# Patient Record
Sex: FEMALE | Race: WHITE | NOT HISPANIC OR LATINO | Employment: PART TIME | URBAN - NONMETROPOLITAN AREA
[De-identification: names, ages, dates, MRNs, and addresses within clinical notes are randomized per-mention and may not be internally consistent; named-entity substitution may affect disease eponyms.]

---

## 2021-02-09 LAB
BILIRUB SERPL-MCNC: NEGATIVE MG/DL
BLOOD URINE, POC: NEGATIVE
CLARITY, POC UA: CLEAR
COLOR, POC UA: YELLOW
GLUCOSE UR QL STRIP: NEGATIVE
KETONES UR QL STRIP: NORMAL
LEUKOCYTE EST, POC UA: NORMAL
NITRITE, POC UA: POSITIVE
PH, POC UA: 6
POC BETA-HCG (QUAL): POSITIVE
PROTEIN, POC: NEGATIVE
SPECIFIC GRAVITY, POC UA: 1.02
UROBILINOGEN, POC UA: NORMAL

## 2021-02-22 LAB — HCV AB SERPL QL IA: NORMAL

## 2021-03-09 LAB
BILIRUB SERPL-MCNC: NEGATIVE MG/DL
CLARITY, POC UA: CLEAR
COLOR, POC UA: YELLOW
LEUKOCYTE EST, POC UA: NEGATIVE
NITRITE, POC UA: NEGATIVE
PROTEIN, POC: NEGATIVE

## 2021-04-05 LAB
CLARITY, POC UA: CLEAR
COLOR, POC UA: YELLOW
GLUCOSE UR QL STRIP: NEGATIVE
LEUKOCYTE EST, POC UA: NEGATIVE
NITRITE, POC UA: NEGATIVE
PROTEIN, POC: NEGATIVE

## 2021-05-21 ENCOUNTER — HISTORICAL (OUTPATIENT)
Dept: ADMINISTRATIVE | Facility: HOSPITAL | Age: 26
End: 2021-05-21

## 2021-06-01 LAB
BILIRUB SERPL-MCNC: NEGATIVE MG/DL
BLOOD URINE, POC: NEGATIVE
CLARITY, POC UA: CLEAR
COLOR, POC UA: YELLOW
GLUCOSE UR QL STRIP: NEGATIVE
KETONES UR QL STRIP: NEGATIVE
LEUKOCYTE EST, POC UA: NEGATIVE
NITRITE, POC UA: NEGATIVE
PH, POC UA: 6
PROTEIN, POC: NEGATIVE
SPECIFIC GRAVITY, POC UA: 1.02
UROBILINOGEN, POC UA: NORMAL

## 2021-06-07 LAB — GLUCOSE 1H P 100 G GLC PO SERPL-MCNC: 74 MG/DL (ref 70–140)

## 2021-06-23 LAB
BILIRUB SERPL-MCNC: NEGATIVE MG/DL
BLOOD URINE, POC: NEGATIVE
CLARITY, POC UA: CLEAR
COLOR, POC UA: YELLOW
GLUCOSE UR QL STRIP: NEGATIVE
KETONES UR QL STRIP: NEGATIVE
LEUKOCYTE EST, POC UA: NORMAL
NITRITE, POC UA: NEGATIVE
PH, POC UA: 7
PROTEIN, POC: NEGATIVE
SPECIFIC GRAVITY, POC UA: 1.01
UROBILINOGEN, POC UA: NORMAL

## 2021-07-07 LAB
ALBUMIN SERPL-MCNC: 3.7 G/DL (ref 3.4–5)
ALBUMIN/GLOB SERPL: 1.3 {RATIO}
ALP SERPL-CCNC: 54 U/L (ref 50–144)
ALT SERPL-CCNC: 30 U/L (ref 1–45)
ANION GAP SERPL CALC-SCNC: 6 MMOL/L (ref 7–16)
AST SERPL-CCNC: 26 U/L (ref 14–36)
BILIRUB SERPL-MCNC: 0.42 MG/DL (ref 0.1–1)
BUN SERPL-MCNC: 9 MG/DL (ref 7–20)
CALCIUM SERPL-MCNC: 9.3 MG/DL (ref 8.4–10.2)
CHLORIDE SERPL-SCNC: 107 MMOL/L (ref 94–110)
CO2 SERPL-SCNC: 23 MMOL/L (ref 21–32)
CREAT SERPL-MCNC: 0.48 MG/DL (ref 0.52–1.04)
CREAT/UREA NIT SERPL: 18.8 (ref 12–20)
ERYTHROCYTE [DISTWIDTH] IN BLOOD BY AUTOMATED COUNT: 13 % (ref 11–14.5)
EST CREAT CLEARANCE SER (OHS): 184.98 ML/MIN
GLOBULIN SER-MCNC: 2.8 G/DL (ref 2–3.9)
GLUCOSE SERPL-MCNC: 66 MGM./DL (ref 70–115)
HCT VFR BLD AUTO: 33.2 % (ref 36–48)
HGB BLD-MCNC: 10.8 G/DL (ref 11.8–16)
MCH RBC QN AUTO: 31.6 PG (ref 27–34)
MCHC RBC AUTO-ENTMCNC: 32.5 G/DL (ref 31–37)
MCV RBC AUTO: 97.1 FL (ref 79–99)
PLATELET # BLD AUTO: 261 X10(3)/MCL (ref 140–371)
PMV BLD AUTO: 9.1 FL (ref 9.4–12.4)
POTASSIUM SERPL-SCNC: 4.3 MMOL/L (ref 3.5–5.1)
PROT SERPL-MCNC: 6.5 G/DL (ref 6.3–8.2)
RBC # BLD AUTO: 3.42 X10(6)/MCL (ref 4–5.1)
SODIUM SERPL-SCNC: 136 MMOL/L (ref 135–145)
WBC # SPEC AUTO: 8.9 X10(3)/MCL (ref 4–11.5)

## 2021-08-02 LAB — RPR SER QL: NORMAL

## 2021-08-30 LAB
GLUCOSE UR QL STRIP: NEGATIVE
PROTEIN, POC: NEGATIVE

## 2021-09-07 LAB
CLARITY, POC UA: CLEAR
COLOR, POC UA: YELLOW
GLUCOSE UR QL STRIP: NEGATIVE
HBV SURFACE AG SERPL QL IA: NEGATIVE
HIV 1+2 AB+HIV1 P24 AG SERPL QL IA: NORMAL
LEUKOCYTE EST, POC UA: NEGATIVE
NITRITE, POC UA: NEGATIVE
PROTEIN, POC: NEGATIVE
RPR SER QL: NORMAL

## 2021-09-08 LAB
ERYTHROCYTE [DISTWIDTH] IN BLOOD BY AUTOMATED COUNT: 13.5 % (ref 11–14.5)
HCT VFR BLD AUTO: 23.1 % (ref 36–48)
HGB BLD-MCNC: 7.8 G/DL (ref 11.8–16)
MCH RBC QN AUTO: 32.5 PG (ref 27–34)
MCHC RBC AUTO-ENTMCNC: 33.8 G/DL (ref 31–37)
MCV RBC AUTO: 96.3 FL (ref 79–99)
PLATELET # BLD AUTO: 210 X10(3)/MCL (ref 140–371)
PMV BLD AUTO: 9.8 FL (ref 9.4–12.4)
RBC # BLD AUTO: 2.4 X10(6)/MCL (ref 4–5.1)
WBC # SPEC AUTO: 13.6 X10(3)/MCL (ref 4–11.5)

## 2021-10-20 LAB — POC BETA-HCG (QUAL): NEGATIVE

## 2022-04-10 ENCOUNTER — HISTORICAL (OUTPATIENT)
Dept: ADMINISTRATIVE | Facility: HOSPITAL | Age: 27
End: 2022-04-10

## 2022-04-27 VITALS
HEIGHT: 62 IN | BODY MASS INDEX: 24.26 KG/M2 | DIASTOLIC BLOOD PRESSURE: 56 MMHG | SYSTOLIC BLOOD PRESSURE: 108 MMHG | WEIGHT: 131.81 LBS

## 2022-04-30 ENCOUNTER — HISTORICAL (OUTPATIENT)
Dept: ADMINISTRATIVE | Facility: HOSPITAL | Age: 27
End: 2022-04-30

## 2022-05-03 NOTE — HISTORICAL [facility]
This is a historical note converted from [place]. Formatting and pictures may have been removed.  Please reference Hayden for original formatting and attached multimedia. Chief Complaint  New OB. LMP 12/14/20. + Nitrite on UA today. Last pap 6/11/19 wnl.  History of Present Illness  26yo G1 @ 8w1d by LMP, presents for new OB. No complaints.?  LMP/EGA/FREYA  Gestational Age (EGA) and FREYA?? ? * Note: EGA calculated as of 02/09/2021  ?  FREYA:?09/27/2021???EGA*:?7 weeks 1 day ? ? ? ? ? ?Type:?Final??Method Date:?02/09/2021  ?  ?? ? ?Method:?Ultrasound?(02/09/2021)  ?? ? ?Confirmation:?Confirmed  ?? ? ?Description:?--  ?? ? ?Comments:?--  ?? ? ?Entered by:?Napoleon DEJESUS, Elvira ESTRADA on 02/09/2021?  ?  Other FREYA Calculations for this Pregnancy:  ?  ?? ? ?Method:?Last Menstrual Period  ?? ? ?Method Date:?12/14/2020  ?? ? ?FREYA:?09/20/2021  ?? ? ?EGA (At Entry):?8 weeks 1 days  ?? ? ?Type:?Non-Authoritative  ?? ? ?Comments:?--  ?? ? ?Entered by:?Ro Nash LPN on 02/09/2021  Gynecological History  Last Menstrual Period: 12/14/20  Date of Last Pap Smear: 06/11/19  Menstrual Status Intake: Menarcheal  Age of Menarche: 12  STIs/STDs: No  Intermenstrual Bleeding: No  Abnormal Pap: No  Current Birth Control Method: Pregnant  Dysmenorrhea: Mild  HPV Vaccine: Yes  Flow: Light  Dyspareunia: No  Duration of Flow: 7  Postcoital Bleeding: No  Frequency of Cycle: 28  Dysuria: No  Additional GYN Information: pap 6/11/19 wnl  Breast CA Hx: No  Sexually Active: Yes  Review of Systems  General/Constitutional:?  Chills?denies. Fatigue/weakness?denies?. Fever?denies?. Night sweats?denies?.  Skin:  Rash?denies.  Respiratory:  Cough?denies?. Hemoptysis?denies?. SOB?denies?. Sputum production?denies?. Wheezing?denies?.  Cardiovascular:  Chest pain?denies. Dizziness?denies. Palpitations?denies. Swelling in hands/feet?denies.  Breast:  Changes in skin of nipple or breast?denies?. Breast lump?denies. Breast pain?denies. Nipple  discharge?denies?.  Gastrointestinal:  Abdominal pain?denies?. Blood in stool?denies?. Constipation?denies?. Diarrhea?denies?. Heartburn?denies?. Nausea?denies?. Vomiting?deniesGenitourinary:  Incontinence?denies?. Blood in urine?denies. Frequent urination?denies?. Painful urination?denies.  Gyneocologic:  Irregular menses?denies. Heavy bleeding?denies. Painful menses?denies. Vaginal discharge/ Odor?denies?. Vaginal lesion/pain?denies. ?Pelvic pain?denies?. Decreased libido?denies. Vulvar lesion/ulcer?denies?. Prolapse of genital organs?denies?. Painful intercourse?denies?.  Menopausal:  Hot flushes?denies. Vaginal dryness?denies.  Musculoskeletal:  Joint/maico pain?denies. Decreased ROM?denies. Muscle aches?denies. Swollen joints?denies?. Weakness?denies.  Neurologic:  Confusion?denies. Trouble walking?denies. Trouble with balance?denies?Balance difficulty?denies. Gait abnormalities?denies. Headache?denies. Tingling/Numbness?denies.  Psychiatric:  Mood lability?denies.?Anxiety or panic attacks?denies. Depressed mood?denies. Suicidal thoughts?denies.?  Physical Exam  Vitals & Measurements  T:?37.3? ?C (Temporal Artery)? BP:?114/68?  HT:?157.48?cm? WT:?60.600?kg? BMI:?24.44? LMP:?12/14/2020 00:00 CST?  Chaperone: present  ?   General appearance: - healthy, well-nourished and well-developed  ?   Psychiatric: Orientation to time, place and person. Normal mood and affect and active, alert  ?   Skin:  Appearance: no rashes or lesions  ?   Neck:  Neck: supple, FROM, trachea midline. and no masses  Thyroid: no enlargement or nodules and non-tender.  ?  ?   Cardiovascular:  Auscultation: RRR and no murmur  Peripheral Vascular: no varicosities, LLE edema, RLE edema, calf tenderness, and palpable cord and pedal pulses intact  ?   Lungs:  Respiratory effort: no intercostal retractions or accessory muscle usage  Auscultation: no wheezing, rales/crackles, or rhonchi and clear to auscultation  ?  Breast:  Inspection/Palpation: no  tenderness, discrete/distinct masses, skin changes, or abnormal secretions. Nipple appearance normal.  ?  Abdomen:  Auscultation/Inspection/Palpation: no hepatomegaly, splenomegaly, masses , tenderness? or CVA tenderness and soft, non distended bowel sounds preset  Hernia: no palpable hernias  ?  Female Genitalia:  Vulva: no masses,?tenderness or?lesions  Bladder/Urethra: no urethral discharge or mass, normal meatus, bladder non-distended  Vagina: no tenderness, erythema, cystocele, rectocele, abnormal vaginal discharge, or vesicle(s) or ulcers  Cervix: no discharge , no cervical lacerations noted or motion tenderness and grossly normal  Uterus: normal size and shape and midline, non-tender, and no uterine prolapse.  Adnexa/Parametria: no parametrial tenderness or mass, no adnexal tenderness or ovarian mass  ?  Lymph Nodes:  Palpation: non tender submandibular nodes, axillary nodes, or inguinal nodes  ?  Rectal Exam:  Rectum: normal perianal skin  Assessment/Plan  1.?UTI in pregnancy, antepartum?O23.40  ?- UA  - discussed urinalysis results and patients symptoms  - educated  - begin antibiotics immediately  - culture sent to lab  - advised to to call if symptoms do not improve within 24 hrs or if she develops fever  ?  ?  Ordered:  cephalexin, 500 mg = 1 cap(s), Oral, QID, X 7 day(s), # 28 cap(s), 0 Refill(s), Pharmacy: [facility], 157.48, cm, Height/Length Dosing, 02/09/21 14:31:00 CST, 60.6, kg, Weight Dosing, 02/09/21 14:31:00 CST  multivitamin, prenatal, 1 tab(s), Oral, Daily, # 60 tab(s), 6 Refill(s), Pharmacy: [facility], 157.48, cm, Height/Length Dosing, 02/09/21 14:31:00 CST, 60.6, kg, Weight Dosing, 02/09/21 14:31:00 CST  Clinic Follow up, *Est. 03/09/21 3:00:00 CST, Order for future visit, UTI in pregnancy, antepartum, BHAVIN DOVE  Office/Outpatient Visit Level 4 Established 38340 , UTI in pregnancy, antepartum, BHAVIN DOVE, 02/09/21 14:49:00 CST  ?  2.?7 weeks  gestation of pregnancy?Z3A.01  ?We discussed diet and supplements and modifiable risk factors.  Patient advised to continue moderate physical activity.  Patient will report unusual headaches, visual disturbances, pelvic pain or cramping, vaginal bleeding, rupture of membranes, extreme swelling in hands or face, diminished urine volume, any prolonged illness or infection, or persistent symptoms of labor.  ?  ?   Discussed with patient on?the uncertainty of?COVID 19?in relation to?pregnancy?complications with patient and unborn fetus.? Advised to CDC guidelines of social distancing ,patient to stay home as much as possible, and?limit contact with others.? Travel restrictions discussed. ?Patient to call office if she has?a temperature over 100.4?cough shortness of breath?or GI symptoms. Patient educated if?she feels she is in an emergency to call 911.?  ?  Ordered:  cephalexin, 500 mg = 1 cap(s), Oral, QID, X 7 day(s), # 28 cap(s), 0 Refill(s), Pharmacy: [facility], 157.48, cm, Height/Length Dosing, 21 14:31:00 CST, 60.6, kg, Weight Dosing, 21 14:31:00 CST  ?  Orders:  Urine Culture [id number], Routine collect, 21 14:25:00 CST, Order for future visit, Outside Lab Order?, Urine, Clean Catch, Nurse collect, Stop date 21 14:25:00 CST, Pregnant  Referrals  Clinic Follow up, *Est. 21 3:00:00 CST, Order for future visit, UTI in pregnancy, antepartum, [facility]   OB History  Pregnancy History???(0,0,0,0)?? ??  ?  ??No previous pregnancies history have been recorded  Problem List/Past Medical History  Ongoing  7 weeks gestation of pregnancy  Pregnant  UTI in pregnancy, antepartum  Historical  No qualifying data  Procedure/Surgical History  PAP (2019)  REPAIR OF EARDRUM   Medications  Claritin 10 mg oral tablet, 10 mg= 1 tab(s), Oral, Daily  Keflex 500 mg oral capsule, 500 mg= 1 cap(s), Oral, QID  Prenatal 1 Plus 1 (Pt. Own), 1 tab(s), Oral, Daily  Prenatal Multivitamins  with Folic Acid 0.8 mg oral tablet, 1 tab(s), Oral, Daily, 6 refills  Allergies  No Known Allergies  No Known Medication Allergies  Social History  Abuse/Neglect  No, 07/15/2020  Alcohol  Never, 07/15/2020  Sexual  Sexually active: Yes. Gender Identity Identifies as female. No, 07/15/2020  Tobacco  Never (less than 100 in lifetime), N/A, 07/15/2020  Family History  Primary malignant neoplasm of breast: Negative: Mother, Father, Sister and Brother.  Primary malignant neoplasm of colon: Negative: Mother, Father, Sister and Brother.  Primary malignant neoplasm of female genital organ: Negative: Mother, Father, Sister and Brother.  Immunizations  Vaccine Date Status   human papillomavirus vaccine 03/26/2009 Recorded   human papillomavirus vaccine 04/14/2008 Recorded   human papillomavirus vaccine 10/23/2007 Recorded   Health Maintenance  Health Maintenance  ???Pending?(in the next year)  ??? ??OverDue  ??? ? ? ?Influenza Vaccine due??10/01/20??and every 1??day(s)  ??? ??Due?  ??? ? ? ?Alcohol Misuse Screening due??01/02/21??and every 1??year(s)  ??? ? ? ?ADL Screening due??02/09/21??and every 1??year(s)  ??? ? ? ?Depression Screening due??02/09/21??Unknown Frequency  ??? ? ? ?Tetanus Vaccine due??02/09/21??and every 10??year(s)  ??? ??Due In Future?  ??? ? ? ?Obesity Screening not due until??01/01/22??and every 1??year(s)  ???Satisfied?(in the past 1 year)  ??? ??Satisfied?  ??? ? ? ?Blood Pressure Screening on??02/09/21.??Satisfied by Ro Nash LPN  ??? ? ? ?Body Mass Index Check on??02/09/21.??Satisfied by Ro Nash LPN  ??? ? ? ?Obesity Screening on??02/09/21.??Satisfied by Ro Nash LPN  ?

## 2022-09-16 ENCOUNTER — HISTORICAL (OUTPATIENT)
Dept: ADMINISTRATIVE | Facility: HOSPITAL | Age: 27
End: 2022-09-16

## 2022-09-17 ENCOUNTER — HISTORICAL (OUTPATIENT)
Dept: ADMINISTRATIVE | Facility: HOSPITAL | Age: 27
End: 2022-09-17

## 2023-02-27 ENCOUNTER — TELEPHONE (OUTPATIENT)
Dept: OBSTETRICS AND GYNECOLOGY | Facility: CLINIC | Age: 28
End: 2023-02-27

## 2023-02-27 NOTE — TELEPHONE ENCOUNTER
----- Message from Sebastian Payne sent at 2/27/2023 10:09 AM CST -----  Pt has questions regarding medication she is on-     Started taking Buspirone 12/22  Waking up throughout the night.   Feet itch      Started taking OCP 12/22,   bleeding since.

## 2023-03-03 ENCOUNTER — TELEPHONE (OUTPATIENT)
Dept: FAMILY MEDICINE | Facility: CLINIC | Age: 28
End: 2023-03-03

## 2023-03-03 DIAGNOSIS — R11.10 VOMITING, UNSPECIFIED VOMITING TYPE, UNSPECIFIED WHETHER NAUSEA PRESENT: Primary | ICD-10-CM

## 2023-03-03 RX ORDER — ONDANSETRON 8 MG/1
8 TABLET, ORALLY DISINTEGRATING ORAL EVERY 12 HOURS PRN
Qty: 20 TABLET | Refills: 1 | Status: SHIPPED | OUTPATIENT
Start: 2023-03-03 | End: 2023-04-27

## 2023-03-03 NOTE — TELEPHONE ENCOUNTER
Pt has had vomiting and diarrhea all night.  She would like to know if something can be called in    [facility]

## 2023-03-15 NOTE — PROGRESS NOTES
Chief Complaint:  follow up buspar and sprintec    History of Present Illness:  Patient is a 27 y.o.  presents to follow up on Buspar. She discontinued Buspar due to making her overanalyze her actions and caused itching of the feet. Once stopping, her thinking became more clear, mood improved and feet stopped itching. States she still feels anxious and has difficulty concentrating.     Also follow up on sprintec. C/o daily bleeding since initiation in December. Flow varies day to day. Unsure of LMP due to inconsistent bleeding. Denies stress.       LMP: Irregular bleeding  Frequency: continuous since Dec.  Cycle Length: n/a   Flow: varies  Intermenstrual Bleeding: Yes  Postcoital Bleeding: No  Dysmenorrhea: No  Sexually Active: Yes   Dyspareunia: Yes  Contraception: OCP, Sprintec   H/o STI: No   Last pap: 22 - Neg   H/o Abnormal Pap: No     Review of systems:  General/Constitutional: Chills denies. Fatigue/weakness denies. Fever denies . Night sweats denies . Hot flashes denies  Gastrointestinal: Abdominal pain denies. Blood in stool denies. Constipation denies. Diarrhea denies. Heartburn denies. Nausea denies. Vomiting denies   Genitourinary: Incontinence denies. Blood in urine denies. Frequent urination denies.  Urgency denies. Painful urination denies. Nocturia denies   Gynecologic: Irregular menses ADMITS.  Heavy bleeding  ADMITS.  Painful menses denies.  Vaginal discharge denies. Vaginal odor denies. Vaginal itching/Irritation denies. Vaginal lesion denies.  Pelvic pain denies. Decreased libido denies. Vulvar lesion denies. Prolapse of genital organs denies. Painful intercourse denies. Postcoital bleeding denies   Psychiatric: Mood lability denies.  Depressed mood denies. Suicidal thoughts denies. Anxiety denies.  Overwhelmed denies.  Appetite normal. Energy level normal       OB History          1    Para   1    Term   1            AB        Living   1         SAB        IAB         "Ectopic        Multiple        Live Births   1               Past Medical History:   Diagnosis Date    Anxiety disorder, unspecified      Current Outpatient Medications:     norgestimate-ethinyl estradioL (ORTHO-CYCLEN) 0.25-35 mg-mcg per tablet, Take 1 tablet by mouth., Disp: , Rfl:     buPROPion (WELLBUTRIN XL) 150 MG TB24 tablet, Take 1 tablet (150 mg total) by mouth once daily., Disp: 30 tablet, Rfl: 3    busPIRone (BUSPAR) 5 MG Tab, Take 10 mg by mouth 2 (two) times daily., Disp: , Rfl:     ondansetron (ZOFRAN-ODT) 8 MG TbDL, Take 1 tablet (8 mg total) by mouth every 12 (twelve) hours as needed (nausea/vomiting). (Patient not taking: Reported on 3/16/2023), Disp: 20 tablet, Rfl: 1    Review of patient's allergies indicates:  No Known Allergies    Past Surgical History:   Procedure Laterality Date    TYMPANIC MEMBRANE REPAIR Right 2013     Family History   Problem Relation Age of Onset    Breast cancer Neg Hx     Colon cancer Neg Hx     Ovarian cancer Neg Hx     Uterine cancer Neg Hx     Cervical cancer Neg Hx      Social History     Socioeconomic History    Marital status: Unknown   Tobacco Use    Smoking status: Never     Passive exposure: Never    Smokeless tobacco: Never   Substance and Sexual Activity    Alcohol use: Yes     Comment: 1-2 times per week    Drug use: Never    Sexual activity: Yes     Partners: Male     Birth control/protection: OCP     Comment: ;  STI: None       Physical Exam:  /64 (BP Location: Right arm, Patient Position: Sitting, BP Method: Medium (Manual))   Temp 98.6 °F (37 °C) (Temporal)   Ht 5' 3" (1.6 m)   Wt 57.5 kg (126 lb 12.2 oz)   BMI 22.46 kg/m²     Chaperone present.         Constitutional: General appearance: healthy, well-nourished and well-developed   Psychiatric:  Orientation to time, place and person. Normal mood and affect and active, alert   Abdomen: Auscultation/Inspection/Palpation: No tenderness or masses. Soft, nondistended    Female Genitalia:      " Vulva: no masses, atrophy or lesions      Bladder/Urethra: no urethral discharge or mass, normal meatus, bladder                 non-distended.      Vagina: no tenderness, erythema, cystocele, rectocele, abnormal                vaginal discharge, or vesicle(s) or ulcers                   Cervix: no discharge or cervical motion tenderness and grossly normal      Uterus: normal size and shape and midline, non-tender, and no uterine                  prolapse.      Adnexa/Parametria: no parametrial tenderness or mass, no adnexal tenderness                 or ovarian mass.         Assessment/Plan:  1. Menometrorrhagia  Educated  Bleeding precautions  Discontinue Sprintec  Negative UPT today  RTS pelvis  TSH, FT4, CBC  Gc/cz/tv/myco/urea    Explained common options for contraception including natural family planning, barrier methods, depo-provera, ocps,  patch, nuvaring, IUD, Nexplanon, and sterilization.        Discussed that pills should be taken at the same time every day to minimize breakthrough bleeding and to expect breakthrough bleeding for the first 3 packs and then it should resolve. If BTB continues after 3 months, a change may be necessary.        Advised patient that smoking is harmful due to increased risks of stroke, heart attack and blood clots when taking pills. Patient to contact us immediately if she experiences severe abdominal pain, severe chest pain, severe headaches, eye-visual changes, severe leg pain or SOB.       Discussed that birth control do not protect against STDs.    Desires Mirena IUD, forms signed today. Will continue Sprintec until IUD arrives      2. Anxiety  Educated     Counseling information given for Arabella Barros [facility] [phone]     HI & SI precautions  Mood stable  Rx: Wellbutrin 150mg XL daily

## 2023-03-16 ENCOUNTER — OFFICE VISIT (OUTPATIENT)
Dept: OBSTETRICS AND GYNECOLOGY | Facility: CLINIC | Age: 28
End: 2023-03-16
Payer: COMMERCIAL

## 2023-03-16 VITALS
DIASTOLIC BLOOD PRESSURE: 64 MMHG | HEIGHT: 63 IN | BODY MASS INDEX: 22.46 KG/M2 | WEIGHT: 126.75 LBS | SYSTOLIC BLOOD PRESSURE: 110 MMHG | TEMPERATURE: 99 F

## 2023-03-16 DIAGNOSIS — N92.1 MENOMETRORRHAGIA: ICD-10-CM

## 2023-03-16 DIAGNOSIS — F41.9 ANXIETY: ICD-10-CM

## 2023-03-16 LAB
B-HCG UR QL: NEGATIVE
CHLAMYDIA BY PCR: NEGATIVE
CTP QC/QA: YES
Lab: NEGATIVE
MYCOPLASMA GENITALIUM, PCR: NEGATIVE
NEISSERIA GONORRHEA BY PCR: NEGATIVE
TRICHOMONAS VAGINALIS: NEGATIVE
U UREALYTICUM DNA SPEC QL NAA+PROBE: NEGATIVE

## 2023-03-16 PROCEDURE — 99214 OFFICE O/P EST MOD 30 MIN: CPT | Mod: ,,, | Performed by: OBSTETRICS & GYNECOLOGY

## 2023-03-16 PROCEDURE — 99214 PR OFFICE/OUTPT VISIT, EST, LEVL IV, 30-39 MIN: ICD-10-PCS | Mod: ,,, | Performed by: OBSTETRICS & GYNECOLOGY

## 2023-03-16 RX ORDER — BUSPIRONE HYDROCHLORIDE 5 MG/1
10 TABLET ORAL 2 TIMES DAILY
COMMUNITY
Start: 2022-12-29 | End: 2023-11-01

## 2023-03-16 RX ORDER — BUPROPION HYDROCHLORIDE 150 MG/1
150 TABLET ORAL DAILY
Qty: 30 TABLET | Refills: 3 | Status: SHIPPED | OUTPATIENT
Start: 2023-03-16 | End: 2023-07-13

## 2023-03-16 RX ORDER — NORGESTIMATE AND ETHINYL ESTRADIOL 0.25-0.035
1 KIT ORAL
COMMUNITY
Start: 2023-03-03 | End: 2023-11-01

## 2023-03-22 ENCOUNTER — HOSPITAL ENCOUNTER (OUTPATIENT)
Dept: RADIOLOGY | Facility: HOSPITAL | Age: 28
Discharge: HOME | End: 2023-03-22
Attending: OBSTETRICS & GYNECOLOGY
Payer: COMMERCIAL

## 2023-03-22 DIAGNOSIS — N92.1 MENOMETRORRHAGIA: ICD-10-CM

## 2023-03-22 PROCEDURE — 76856 US EXAM PELVIC COMPLETE: CPT | Mod: TC

## 2023-04-27 ENCOUNTER — PROCEDURE VISIT (OUTPATIENT)
Dept: OBSTETRICS AND GYNECOLOGY | Facility: CLINIC | Age: 28
End: 2023-04-27
Payer: COMMERCIAL

## 2023-04-27 ENCOUNTER — PATIENT MESSAGE (OUTPATIENT)
Dept: OBSTETRICS AND GYNECOLOGY | Facility: CLINIC | Age: 28
End: 2023-04-27

## 2023-04-27 VITALS
WEIGHT: 124 LBS | DIASTOLIC BLOOD PRESSURE: 70 MMHG | HEIGHT: 63 IN | BODY MASS INDEX: 21.97 KG/M2 | SYSTOLIC BLOOD PRESSURE: 114 MMHG

## 2023-04-27 DIAGNOSIS — Z30.430 ENCOUNTER FOR INSERTION OF MIRENA IUD: ICD-10-CM

## 2023-04-27 NOTE — PROCEDURES
"Chief Complaint:  IUD insertion    History of Present Illness:   Socorro Stevens is a 27 y.o.  who presents today for Mirena IUD placement.      LMP: Irregular bleeding  Frequency: continuous since Dec.  Cycle Length: n/a   Flow: varies  Intermenstrual Bleeding: Yes  Postcoital Bleeding: No  Dysmenorrhea: No  Sexually Active: Yes   Dyspareunia: Yes  Contraception: OCP, Sprintec   H/o STI: No   Last pap: 22 - Neg   H/o Abnormal Pap: No   Gardasil: 3/3   MMG: N/A      Review of Systems:  General/Constitutional: Chills denies. Fatigue/weakness denies. Fever denies . Night sweats denies . Hot flashes denies  Gynecologic: Irregular menses denies.  Heavy bleeding  denies.  Painful menses denies.  Vaginal discharge denies. Vaginal odor denies. Vaginal itching/Irritation denies. Vaginal lesion denies.  Pelvic pain denies. Decreased libido denies. Vulvar lesion denies. Prolapse of genital organs denies. Painful intercourse denies. Postcoital bleeding denies   Psychiatric: Mood lability denies.  Depressed mood denies. Suicidal thoughts denies. Anxiety denies.  Overwhelmed denies.  Appetite normal. Energy level normal      OB History          1    Para   1    Term   1            AB        Living   1         SAB        IAB        Ectopic        Multiple        Live Births   1                 Physical Exam:  /70 (BP Location: Right arm)   Ht 5' 3" (1.6 m)   Wt 56.2 kg (124 lb)   Breastfeeding No   BMI 21.97 kg/m²     Chaperone present.    Constitutional: General appearance: healthy, well-nourished and well-developed  Psychiatric: Orientation to time, place and person. Normal mood and affect and active, alert  Abdomen: Auscultation/Inspection/Palpation: deferred  Female Genitalia:  Vulva: no masses, atrophy or lesions  Bladder/Urethra: no urethral discharge or mass, normal meatus, bladder   non-distended.  Vagina: no tenderness, erythema, cystocele, rectocele, abnormal  vaginal discharge, or " vesicle(s) or ulcers   Cervix: no discharge or cervical motion tenderness and grossly normal  Uterus: normal size and shape and midline, non-tender, and no uterine   prolapse.  Adnexa/Parametria: no parametrial tenderness or mass, no adnexal tenderness  or ovarian mass.    Procedure:   After having reviewed the contraindications, side effects, and risks and benefits of all major options for reversible contraception, the patient chose the Mirena IUD for contraception. We discussed the risks of insertion, pelvic infection, and the possibility of failure. Patient stated that she has a good understanding of all we discussed, and all questions were answered regarding IUD use. Patient signed consent form.   Pelvic examination was normal. Pap smear is current. Urine pregnancy test negative.  With the patient in the dorsal lithotomy position, a speculum was placed in the vagina. The cervix and vagina were prepped with Betadine, the cervix was stabilized with a tenaculum, and the uterus was sounded to a depth of 7 cm. The Mirena IUD was then placed in the endometrial cavity as directed without complications. The strings were trimmed to approximately to 2 cm.  Patient tolerated procedure well.      Assessment/Plan:  1. Encounter for insertion of mirena IUD   Explained to patient, and options for contraceptive including natural family planning, barrier methods, oral contraceptives, patch, NuvaRing, Depo-Provera, Nexplanon, IUD, and sterilization     Patient desires IUD insertion.      Risk factors reviewed and not present.      HCG negative.       Tolerated well.       Discussed cramping and bleeding for 6-8 weeks.  If breakthrough bleeding continues after 3 months add back or change may be necessary.       A second form of birth control should be used for the first 10 days after insertion.       Advised patient that smoking is harmful due to increased risk of stroke, heart attack, and blood clots when taking contraceptive  hormones. Patient is to contact us immediately if she experiences severe abdominal pain, severe chest pain, severe headaches, eye visual changes, severe leg pain, or shortness of breath       Call with fever, foul discharge, or intense abdominal pain. Ibuprofen for cramping.       Return to clinic 3 weeks for string check.     Reminded IUD no longer effective after 8 years    Discontinue sprintec      Consent signed and time out performed

## 2023-05-16 NOTE — PROGRESS NOTES
Chief Complaint:  F/U Mirena, string check      History of Present Illness:  Patient is a 27 y.o.  presents for a string check following Mirena IUD insertion 23. Light spotting since insertion.      LMP: Light spotting since IUD insertion   Frequency: spotting   Cycle Length: n/a   Flow: light   Intermenstrual Bleeding: No   Postcoital Bleeding: No  Dysmenorrhea: No  Sexually Active: Yes   Dyspareunia: No  Contraception: Mirena insertion 23  H/o STI: No   Last pap: 22 - Neg   H/o Abnormal Pap: No   Gardasil: 3/3   MMG: N/A      Review of systems:  General/Constitutional: Chills denies. Fatigue/weakness denies. Fever denies. Night sweats denies. Hot flashes denies  Gastrointestinal: Abdominal pain denies. Blood in stool denies. Constipation denies. Diarrhea denies. Heartburn denies. Nausea denies. Vomiting denies   Genitourinary: Incontinence denies. Blood in urine denies. Frequent urination denies. Urgency denies. Painful urination denies. Nocturia denies   Gynecologic: Irregular menses denies. Heavy bleeding  denies. Painful menses denies. Vaginal discharge denies.Vaginal odor denies. Vaginal itching/Irritation denies. Vaginal lesion denies.  Pelvic pain denies. Decreased libido denies. Vulvar lesion denies. Prolapse of genital organs denies. Painful intercourse denies. Postcoital bleeding denies   Psychiatric: Mood lability denies. Depressed mood denies. Suicidal thoughts denies. Anxiety denies. Overwhelmed denies. Appetite normal. Energy level normal     OB History    Para Term  AB Living   1 1 1 0 0 1   SAB IAB Ectopic Multiple Live Births   0 0 0 0 1      # 1 - Date: 21, Sex: Female, Weight: 3.033 kg (6 lb 11 oz), GA: 37w0d, Delivery: , Low Transverse, Apgar1: None, Apgar5: None, Living: Living, Birth Comments: None      Past Medical History:   Diagnosis Date    Anxiety disorder, unspecified        Current Outpatient Medications:     buPROPion (WELLBUTRIN XL)  "150 MG TB24 tablet, Take 1 tablet (150 mg total) by mouth once daily., Disp: 30 tablet, Rfl: 3    levonorgestreL (MIRENA) 21 mcg/24 hours (8 yrs) 52 mg IUD, 1 each by Intrauterine route once., Disp: , Rfl:     busPIRone (BUSPAR) 5 MG Tab, Take 10 mg by mouth 2 (two) times daily., Disp: , Rfl:     norgestimate-ethinyl estradioL (ORTHO-CYCLEN) 0.25-35 mg-mcg per tablet, Take 1 tablet by mouth., Disp: , Rfl:       Physical Exam:  /70 (BP Location: Right arm)   Temp 98.1 °F (36.7 °C)   Ht 5' 3" (1.6 m)   Wt 54.9 kg (121 lb)   BMI 21.43 kg/m²     Chaperone present.       Constitutional: General appearance: healthy, well-nourished and well-developed     Psychiatric:  Orientation to time, place and person. Normal mood and affect and active, alert     Abdomen: Auscultation/Inspection/Palpation: No tenderness or masses. Soft, nondistended      Female Genitalia:      Vulva: no masses, atrophy or lesions      Bladder/Urethra: no urethral discharge or mass, normal meatus, bladder non-distended.      Vagina: no tenderness, erythema, cystocele, rectocele, abnormal vaginal discharge, or vesicle(s) or ulcers                   Cervix: no discharge or cervical motion tenderness and grossly normal; IUD strings noted      Uterus: normal size and shape and midline, non-tender, and no uterine prolapse.      Adnexa/Parametria: no parametrial tenderness or mass, no adnexal tenderness or ovarian mass.         Assessment/Plan:  1. Checking of intrauterine device  Normal exam; strings noted on cervix    Educated on bleeding patterns with IUD    Gave bleeding precautions    Discussed with patient that birth control does not protect against STIs    Safe sex education    Follow up 3 months to evaluate bleeding       "

## 2023-05-18 ENCOUNTER — OFFICE VISIT (OUTPATIENT)
Dept: OBSTETRICS AND GYNECOLOGY | Facility: CLINIC | Age: 28
End: 2023-05-18
Payer: COMMERCIAL

## 2023-05-18 VITALS
BODY MASS INDEX: 21.44 KG/M2 | HEIGHT: 63 IN | WEIGHT: 121 LBS | DIASTOLIC BLOOD PRESSURE: 70 MMHG | SYSTOLIC BLOOD PRESSURE: 122 MMHG | TEMPERATURE: 98 F

## 2023-05-18 DIAGNOSIS — Z30.431 CHECKING OF INTRAUTERINE DEVICE: Primary | ICD-10-CM

## 2023-05-18 PROCEDURE — 99212 PR OFFICE/OUTPT VISIT, EST, LEVL II, 10-19 MIN: ICD-10-PCS | Mod: ,,, | Performed by: OBSTETRICS & GYNECOLOGY

## 2023-05-18 PROCEDURE — 99212 OFFICE O/P EST SF 10 MIN: CPT | Mod: ,,, | Performed by: OBSTETRICS & GYNECOLOGY

## 2023-06-21 ENCOUNTER — TELEPHONE (OUTPATIENT)
Dept: FAMILY MEDICINE | Facility: CLINIC | Age: 28
End: 2023-06-21
Payer: COMMERCIAL

## 2023-06-21 DIAGNOSIS — B37.31 VAGINAL YEAST INFECTION: Primary | ICD-10-CM

## 2023-06-21 RX ORDER — FLUCONAZOLE 150 MG/1
150 TABLET ORAL DAILY
Qty: 3 TABLET | Refills: 0 | Status: SHIPPED | OUTPATIENT
Start: 2023-06-21 | End: 2023-06-24

## 2023-06-21 NOTE — TELEPHONE ENCOUNTER
Pt said she has a bad vaginal yeast infection.  She wants to know if diflucan can be called out    jmi

## 2023-07-13 DIAGNOSIS — F41.9 ANXIETY: ICD-10-CM

## 2023-07-13 RX ORDER — BUPROPION HYDROCHLORIDE 150 MG/1
TABLET ORAL
Qty: 30 TABLET | Refills: 2 | Status: SHIPPED | OUTPATIENT
Start: 2023-07-13 | End: 2023-08-22 | Stop reason: SDUPTHER

## 2023-07-13 NOTE — TELEPHONE ENCOUNTER
----- Message from Cheli Alejandro sent at 7/13/2023  9:11 AM CDT -----  Regarding: Refill Request  Contact: Patient  Patient is calling for a refill request on her    buPROPion (WELLBUTRIN XL) 150 MG TB24 tablet, Take 1 tablet (150 mg total) by mouth once daily.  Patient is out of her medication as of today.     Telephone Information:  Mobile          [phone]       [facility] - [address] [address]  Phone: [phone] Fax: [phone]

## 2023-08-01 NOTE — PROGRESS NOTES
Chief Complaint:  IUD check/bleeding evaluation (C/o yeast infection)    History of Present Illness:  Patient is a 27 y.o.  presents to evaluate bleeding following mirena IUD insertion 23. States bleeding has improved since insertion. Last cycle last 1.5 wks, bleeding 3-4 tampons/day, panty liner on last 4 days.  Painless. States content.     C/o possible yeast infection. States thick, mucus consistency discharge. No odor. +vaginal irritation. Uses gel soap to wash. States given diflucan from PCP 1 mo ago with little improvement.     H/o anxiety currently taking Wellbutrin 150mg. States recent increase in stress due to job change. c/o feeling anxious during the early mornings between 2-4am. States her mind is racing between those times and cannot go back to sleep. No trouble falling asleep. Denies HI/SI. States she takes her Wellbutrin around 5am every morning. Improvement with Wellbutrin but feels like it does not last. Denies HI or SI.       LMP: 23  Frequency: irregular w/ IUD   Cycle Length: n/a days   Flow: light  Intermenstrual Bleeding: Yes  Postcoital Bleeding: No  Dysmenorrhea: No  Sexually Active: yes   Dyspareunia: No  Contraception: Mirena, 23   H/o STI: No   Last pap: 22 NIL  H/o abnl pap prior to current: No   Colposcopy: no  Gardasil: 3/3   MMG: n/a  H/o abnl MMG: n/a  Colonoscopy: n/a      Review of systems:  General/Constitutional: Chills denies. Fatigue/weakness denies. Fever denies. Night sweats denies. Hot flashes denies  Gastrointestinal: Abdominal pain denies. Blood in stool denies. Constipation denies. Diarrhea denies. Heartburn denies. Nausea denies. Vomiting denies   Genitourinary: Incontinence denies. Blood in urine denies. Frequent urination denies. Urgency denies. Painful urination denies. Nocturia denies   Gynecologic: Irregular menses admits. Heavy bleeding  denies. Painful menses denies. Vaginal discharge admits.Vaginal odor denies. Vaginal  "itching/Irritation admits. Vaginal lesion denies.  Pelvic pain denies. Decreased libido denies. Vulvar lesion denies. Prolapse of genital organs denies. Painful intercourse denies. Postcoital bleeding denies   Psychiatric: Mood lability denies. Depressed mood denies. Suicidal thoughts denies. Anxiety admits. Overwhelmed denies. Appetite normal. Energy level normal     OB History    Para Term  AB Living   1 1 1 0 0 1   SAB IAB Ectopic Multiple Live Births   0 0 0 0 1      # 1 - Date: 21, Sex: Female, Weight: 3.033 kg (6 lb 11 oz), GA: 37w0d, Delivery: , Low Transverse, Apgar1: None, Apgar5: None, Living: Living, Birth Comments: None      Past Medical History:   Diagnosis Date    Anxiety disorder, unspecified        Current Outpatient Medications:     buPROPion (WELLBUTRIN XL) 150 MG TB24 tablet, TAKE 1 TABLET DAILY FOR DEPRESSION, Disp: 30 tablet, Rfl: 2    levonorgestreL (MIRENA) 21 mcg/24 hours (8 yrs) 52 mg IUD, 1 each by Intrauterine route once., Disp: , Rfl:     busPIRone (BUSPAR) 5 MG Tab, Take 10 mg by mouth 2 (two) times daily., Disp: , Rfl:     norgestimate-ethinyl estradioL (ORTHO-CYCLEN) 0.25-35 mg-mcg per tablet, Take 1 tablet by mouth., Disp: , Rfl:       Physical Exam:  /64 (BP Location: Left arm)   Temp 97.5 °F (36.4 °C)   Ht 5' 3" (1.6 m)   Wt 53 kg (116 lb 12.8 oz)   LMP 2023 Comment: irregular menses w Mirena  Breastfeeding No   BMI 20.69 kg/m²     Chaperone present.       Constitutional: General appearance: healthy, well-nourished and well-developed  Psychiatric:  Orientation to time, place and person. Normal mood and affect and active, alert   Abdomen: Auscultation/Inspection/Palpation: No tenderness or masses. Soft, nondistended      Female Genitalia:      Vulva: no masses, atrophy or lesions      Bladder/Urethra: no urethral discharge or mass, normal meatus, bladder non-distended.      Vagina: +scant vaginal discharge; no tenderness, erythema, " cystocele, rectocele, or vesicle(s) or ulcers                   Cervix: no discharge or cervical motion tenderness and grossly normal; +IUD strings noted      Uterus: normal size and shape and midline, non-tender, and no uterine prolapse.      Adnexa/Parametria: no parametrial tenderness or mass, no adnexal tenderness or ovarian mass.       Assessment/Plan:  1. Irregular uterine bleeding  Educated on bleeding patterns  Strings noted on cervix  Gave bleeding precautions  Discussed with patient that birth control does not protect against STIs  Safe sex education  Educated no longer effective after 8 years    2. Bacterial vaginosis  Wet prep: clue + whiff   Educated   Flagyl 500mg BID x7 days  No douching   Call office if no improvement in 72 hours     AVOID: Scented soaps or shampoos, bubble bath, scented detergents, feminine sprays, douches, powders          Fragrance-free pH neutral soap     Unscented detergents     3. Anxiety   Educated     Counseling information given for Arabella Barros [facility] [phone]     HI & SI precautions   Improvement with Wellbutrin 150mg, but still has anxiety towards end of the day  Discussed with patient can increase from daily to BID or increase dosage from 150 to 300 mg.   Pt desires to try twice a day for 6 weeks   FU 6 wks

## 2023-08-02 ENCOUNTER — OFFICE VISIT (OUTPATIENT)
Dept: OBSTETRICS AND GYNECOLOGY | Facility: CLINIC | Age: 28
End: 2023-08-02
Payer: COMMERCIAL

## 2023-08-02 VITALS
DIASTOLIC BLOOD PRESSURE: 64 MMHG | WEIGHT: 116.81 LBS | HEIGHT: 63 IN | BODY MASS INDEX: 20.7 KG/M2 | SYSTOLIC BLOOD PRESSURE: 120 MMHG | TEMPERATURE: 98 F

## 2023-08-02 DIAGNOSIS — R30.0 BURNING WITH URINATION: ICD-10-CM

## 2023-08-02 DIAGNOSIS — B96.89 BACTERIAL VAGINOSIS: ICD-10-CM

## 2023-08-02 DIAGNOSIS — N76.0 BACTERIAL VAGINOSIS: ICD-10-CM

## 2023-08-02 DIAGNOSIS — N92.6 IRREGULAR UTERINE BLEEDING: Primary | ICD-10-CM

## 2023-08-02 DIAGNOSIS — N89.8 VAGINAL DISCHARGE: ICD-10-CM

## 2023-08-02 PROCEDURE — 1159F PR MEDICATION LIST DOCUMENTED IN MEDICAL RECORD: ICD-10-PCS | Mod: CPTII,,, | Performed by: OBSTETRICS & GYNECOLOGY

## 2023-08-02 PROCEDURE — 99214 OFFICE O/P EST MOD 30 MIN: CPT | Mod: ,,, | Performed by: OBSTETRICS & GYNECOLOGY

## 2023-08-02 PROCEDURE — 87210 POCT WET PREP: ICD-10-PCS | Mod: QW,,, | Performed by: OBSTETRICS & GYNECOLOGY

## 2023-08-02 PROCEDURE — 3074F SYST BP LT 130 MM HG: CPT | Mod: CPTII,,, | Performed by: OBSTETRICS & GYNECOLOGY

## 2023-08-02 PROCEDURE — 99214 PR OFFICE/OUTPT VISIT, EST, LEVL IV, 30-39 MIN: ICD-10-PCS | Mod: ,,, | Performed by: OBSTETRICS & GYNECOLOGY

## 2023-08-02 PROCEDURE — 3078F DIAST BP <80 MM HG: CPT | Mod: CPTII,,, | Performed by: OBSTETRICS & GYNECOLOGY

## 2023-08-02 PROCEDURE — 3008F PR BODY MASS INDEX (BMI) DOCUMENTED: ICD-10-PCS | Mod: CPTII,,, | Performed by: OBSTETRICS & GYNECOLOGY

## 2023-08-02 PROCEDURE — 87210 SMEAR WET MOUNT SALINE/INK: CPT | Mod: QW,,, | Performed by: OBSTETRICS & GYNECOLOGY

## 2023-08-02 PROCEDURE — 1159F MED LIST DOCD IN RCRD: CPT | Mod: CPTII,,, | Performed by: OBSTETRICS & GYNECOLOGY

## 2023-08-02 PROCEDURE — 3078F PR MOST RECENT DIASTOLIC BLOOD PRESSURE < 80 MM HG: ICD-10-PCS | Mod: CPTII,,, | Performed by: OBSTETRICS & GYNECOLOGY

## 2023-08-02 PROCEDURE — 3008F BODY MASS INDEX DOCD: CPT | Mod: CPTII,,, | Performed by: OBSTETRICS & GYNECOLOGY

## 2023-08-02 PROCEDURE — 3074F PR MOST RECENT SYSTOLIC BLOOD PRESSURE < 130 MM HG: ICD-10-PCS | Mod: CPTII,,, | Performed by: OBSTETRICS & GYNECOLOGY

## 2023-08-02 RX ORDER — METRONIDAZOLE 500 MG/1
500 TABLET ORAL 2 TIMES DAILY
Qty: 14 TABLET | Refills: 0 | Status: SHIPPED | OUTPATIENT
Start: 2023-08-02 | End: 2023-08-09

## 2023-08-03 LAB
GARDNERELLA VAGINALIS: NEGATIVE
OTHER MICROSC. OBSERVATIONS: ABNORMAL
POC BACTERIAL VAGINOSIS: POSITIVE
POC CLUE CELLS: POSITIVE
TRICHOMONAS, POC: NEGATIVE
YEAST WET PREP: NEGATIVE

## 2023-08-21 ENCOUNTER — TELEPHONE (OUTPATIENT)
Dept: OBSTETRICS AND GYNECOLOGY | Facility: CLINIC | Age: 28
End: 2023-08-21
Payer: COMMERCIAL

## 2023-08-22 ENCOUNTER — TELEPHONE (OUTPATIENT)
Dept: OBSTETRICS AND GYNECOLOGY | Facility: CLINIC | Age: 28
End: 2023-08-22
Payer: COMMERCIAL

## 2023-08-22 DIAGNOSIS — F41.9 ANXIETY: ICD-10-CM

## 2023-08-22 RX ORDER — BUPROPION HYDROCHLORIDE 150 MG/1
150 TABLET ORAL DAILY
Qty: 30 TABLET | Refills: 2 | Status: SHIPPED | OUTPATIENT
Start: 2023-08-22 | End: 2023-08-23 | Stop reason: SDUPTHER

## 2023-08-22 NOTE — TELEPHONE ENCOUNTER
"----- Message from Cheli Javon sent at 8/22/2023  2:05 PM CDT -----  Regarding: Refill Request  Contact: Patient  Type:  RX Refill Request O  DOES NOT NOT HAVE A REFILL TILL NEXT APPT ON 9/6 DUE TO MED CHANGE AT LAST APPT ON 8/2/23.  Who Called:     PATIENT  Medication:     (WELLBUTRIN XL) 150 MG  [facility] - [address] [address]  Phone: [phone] Fax: [phone]     Ordering Provider: DR NORRIS Scherer Call Back Number:[phone]   Additional Information: LAST NOTE STATED  8/2/23 Note-"Discussed with patient can increase from daily to BID or increase dosage from 150 to 300 mg."        (WELLBUTRIN XL) 150 MG    "

## 2023-08-22 NOTE — TELEPHONE ENCOUNTER
"----- Message from Cheli Javon sent at 8/22/2023  2:05 PM CDT -----  Regarding: Refill Request  Contact: Patient  Type:  RX Refill Request O  DOES NOT NOT HAVE A REFILL TILL NEXT APPT ON 9/6 DUE TO MED CHANGE AT LAST APPT ON 8/2/23.  Who Called:     PATIENT  Medication:     (WELLBUTRIN XL) 150 MG  [facility] - [address] [address]  Phone: [phone] Fax: [phone]     Ordering Provider: DR NORRIS Scherer Call Back Number:[phone]   Additional Information: LAST NOTE STATED  8/2/23 Note-"Discussed with patient can increase from daily to BID or increase dosage from 150 to 300 mg."        (WELLBUTRIN XL) 150 MG    "

## 2023-08-23 ENCOUNTER — TELEPHONE (OUTPATIENT)
Dept: OBSTETRICS AND GYNECOLOGY | Facility: CLINIC | Age: 28
End: 2023-08-23
Payer: COMMERCIAL

## 2023-08-23 DIAGNOSIS — F41.9 ANXIETY: ICD-10-CM

## 2023-08-23 RX ORDER — BUPROPION HYDROCHLORIDE 150 MG/1
150 TABLET ORAL 2 TIMES DAILY
Qty: 60 TABLET | Refills: 1 | Status: SHIPPED | OUTPATIENT
Start: 2023-08-23 | End: 2023-09-20 | Stop reason: SDUPTHER

## 2023-08-23 NOTE — TELEPHONE ENCOUNTER
----- Message from Brylee Guillory sent at 8/23/2023  9:11 AM CDT -----  Regarding: Refill  Contact: Socorro  Patient called stating that she called yesterday in reference to the instructions on her medication being different than she was told. She said that she spoke to a nurse and they said that they would fax over a new script with the correct directions on them. The pharmacy got the new script and the instructions were still wrong and the pharmacy will not fill the medication until they have the correct instructions. Patient call back number [phone]

## 2023-08-31 ENCOUNTER — TELEPHONE (OUTPATIENT)
Dept: FAMILY MEDICINE | Facility: CLINIC | Age: 28
End: 2023-08-31
Payer: COMMERCIAL

## 2023-08-31 DIAGNOSIS — B37.31 VAGINAL YEAST INFECTION: Primary | ICD-10-CM

## 2023-08-31 RX ORDER — FLUCONAZOLE 150 MG/1
150 TABLET ORAL DAILY
Qty: 3 TABLET | Refills: 0 | Status: SHIPPED | OUTPATIENT
Start: 2023-08-31 | End: 2023-09-03

## 2023-08-31 NOTE — TELEPHONE ENCOUNTER
Pt has another vaginal yeast infection.  She asked if you will call out diflucan.  She has a gyn appt in September and she plans on talking about the frequency she is getting these.      Fadia

## 2023-09-07 NOTE — PROGRESS NOTES
Chief Complaint:  Follow-up (Wellbutrin)    History of Present Illness:  Patient is a 27 y.o.  presents to follow up on Wellbutrin 150mg BID. She is happy with the medication, anxiety has greatly improved. States she noticed hair loss since initiating wellbutrin.   C/o daily painless spotting with IUD since insertion 2023.       LMP: 23  Frequency: irregular w/ IUD   Cycle Length: n/a  Flow: light  Intermenstrual Bleeding: Yes  Postcoital Bleeding: No  Dysmenorrhea: No  Sexually Active: yes   Dyspareunia: No  Contraception: Mirena, 23   H/o STI: No   Last pap: 22 NIL  H/o abnl pap prior to current: No   Colposcopy: no  Gardasil: 3/3   MMG: n/a  H/o abnl MMG: n/a  Colonoscopy: n/a      Review of systems:  General/Constitutional: Chills denies. Fatigue/weakness denies. Fever denies. Night sweats denies. Hot flashes denies  Gastrointestinal: Abdominal pain denies. Blood in stool denies. Constipation denies. Diarrhea denies. Heartburn denies. Nausea denies. Vomiting denies   Genitourinary: Incontinence denies. Blood in urine denies. Frequent urination denies. Urgency denies. Painful urination denies. Nocturia denies   Gynecologic: Irregular menses denies. Heavy bleeding denies. Painful menses denies. Vaginal discharge denies. Vaginal odor denies. Vaginal itching/Irritation denies. Vaginal lesion denies.  Pelvic pain denies. Decreased libido denies. Vulvar lesion denies. Prolapse of genital organs denies. Painful intercourse denies. Postcoital bleeding denies   Psychiatric: Mood lability denies. Depressed mood denies. Suicidal thoughts denies. Anxiety denies. Overwhelmed denies. Appetite normal. Energy level normal     OB History    Para Term  AB Living   1 1 1 0 0 1   SAB IAB Ectopic Multiple Live Births   0 0 0 0 1      # 1 - Date: 21, Sex: Female, Weight: 3.033 kg (6 lb 11 oz), GA: 37w0d, Delivery: , Low Transverse, Apgar1: None, Apgar5: None, Living: Living, Birth  "Comments: None      Past Medical History:   Diagnosis Date    Anxiety disorder, unspecified        Current Outpatient Medications:     buPROPion (WELLBUTRIN XL) 150 MG TB24 tablet, Take 1 tablet (150 mg total) by mouth 2 (two) times a day., Disp: 60 tablet, Rfl: 1    levonorgestreL (MIRENA) 21 mcg/24 hours (8 yrs) 52 mg IUD, 1 each by Intrauterine route once., Disp: , Rfl:     busPIRone (BUSPAR) 5 MG Tab, Take 10 mg by mouth 2 (two) times daily., Disp: , Rfl:     norgestimate-ethinyl estradioL (ORTHO-CYCLEN) 0.25-35 mg-mcg per tablet, Take 1 tablet by mouth., Disp: , Rfl:       Physical Exam:  /68   Ht 5' 3" (1.6 m)   Wt 53.2 kg (117 lb 4.6 oz)   LMP 08/30/2023 Comment: Mirena , IUD, LMP 8/30/2023- Present  BMI 20.78 kg/m²     Constitutional: General appearance: healthy, well-nourished and well-developed   Psychiatric:  Orientation to time, place and person. Normal mood and affect and active, alert   Abdomen: Auscultation/Inspection/Palpation: No tenderness or masses. Soft, nondistended     Decliens pelvic today     Assessment/Plan:  1. Anxiety  Controlled with Wellbutrin  Educated on 1% risk alopecia  States desires to cont at this time  Offered medication change, declines  Educated     Counseling information given for Arabella Barros [facility] [phone]     HI & SI precautions    2. Menometrorrhagia  -     US Pelvis Complete Non OB; Future     - UPT  - myco/gc/tv/cz/urea neg 8/2023  - RTS for IUD placement  - offered removal, medical or conservative mngt  - cont NSAIDs  Call if no improvement  x1wk          "

## 2023-09-13 ENCOUNTER — CLINICAL SUPPORT (OUTPATIENT)
Dept: FAMILY MEDICINE | Facility: CLINIC | Age: 28
End: 2023-09-13
Payer: COMMERCIAL

## 2023-09-13 DIAGNOSIS — Z23 NEED FOR VACCINATION: Primary | ICD-10-CM

## 2023-09-13 PROCEDURE — 90686 IIV4 VACC NO PRSV 0.5 ML IM: CPT | Mod: ,,, | Performed by: PEDIATRICS

## 2023-09-13 PROCEDURE — 90686 FLU VACCINE (QUAD) GREATER THAN OR EQUAL TO 3YO PRESERVATIVE FREE IM: ICD-10-PCS | Mod: ,,, | Performed by: PEDIATRICS

## 2023-09-13 PROCEDURE — 90471 FLU VACCINE (QUAD) GREATER THAN OR EQUAL TO 3YO PRESERVATIVE FREE IM: ICD-10-PCS | Mod: ,,, | Performed by: PEDIATRICS

## 2023-09-13 PROCEDURE — 90471 IMMUNIZATION ADMIN: CPT | Mod: ,,, | Performed by: PEDIATRICS

## 2023-09-14 ENCOUNTER — OFFICE VISIT (OUTPATIENT)
Dept: OBSTETRICS AND GYNECOLOGY | Facility: CLINIC | Age: 28
End: 2023-09-14
Payer: COMMERCIAL

## 2023-09-14 VITALS
BODY MASS INDEX: 20.79 KG/M2 | HEIGHT: 63 IN | DIASTOLIC BLOOD PRESSURE: 68 MMHG | SYSTOLIC BLOOD PRESSURE: 110 MMHG | WEIGHT: 117.31 LBS

## 2023-09-14 DIAGNOSIS — F41.9 ANXIETY: Primary | ICD-10-CM

## 2023-09-14 DIAGNOSIS — N92.1 MENOMETRORRHAGIA: ICD-10-CM

## 2023-09-14 PROCEDURE — 99214 PR OFFICE/OUTPT VISIT, EST, LEVL IV, 30-39 MIN: ICD-10-PCS | Mod: ,,, | Performed by: OBSTETRICS & GYNECOLOGY

## 2023-09-14 PROCEDURE — 99214 OFFICE O/P EST MOD 30 MIN: CPT | Mod: ,,, | Performed by: OBSTETRICS & GYNECOLOGY

## 2023-09-14 PROCEDURE — 3074F SYST BP LT 130 MM HG: CPT | Mod: CPTII,,, | Performed by: OBSTETRICS & GYNECOLOGY

## 2023-09-14 PROCEDURE — 3078F DIAST BP <80 MM HG: CPT | Mod: CPTII,,, | Performed by: OBSTETRICS & GYNECOLOGY

## 2023-09-14 PROCEDURE — 3008F PR BODY MASS INDEX (BMI) DOCUMENTED: ICD-10-PCS | Mod: CPTII,,, | Performed by: OBSTETRICS & GYNECOLOGY

## 2023-09-14 PROCEDURE — 1159F MED LIST DOCD IN RCRD: CPT | Mod: CPTII,,, | Performed by: OBSTETRICS & GYNECOLOGY

## 2023-09-14 PROCEDURE — 3008F BODY MASS INDEX DOCD: CPT | Mod: CPTII,,, | Performed by: OBSTETRICS & GYNECOLOGY

## 2023-09-14 PROCEDURE — 3074F PR MOST RECENT SYSTOLIC BLOOD PRESSURE < 130 MM HG: ICD-10-PCS | Mod: CPTII,,, | Performed by: OBSTETRICS & GYNECOLOGY

## 2023-09-14 PROCEDURE — 1159F PR MEDICATION LIST DOCUMENTED IN MEDICAL RECORD: ICD-10-PCS | Mod: CPTII,,, | Performed by: OBSTETRICS & GYNECOLOGY

## 2023-09-14 PROCEDURE — 3078F PR MOST RECENT DIASTOLIC BLOOD PRESSURE < 80 MM HG: ICD-10-PCS | Mod: CPTII,,, | Performed by: OBSTETRICS & GYNECOLOGY

## 2023-09-15 ENCOUNTER — TELEPHONE (OUTPATIENT)
Dept: OBSTETRICS AND GYNECOLOGY | Facility: CLINIC | Age: 28
End: 2023-09-15
Payer: COMMERCIAL

## 2023-09-15 NOTE — TELEPHONE ENCOUNTER
----- Message from Alice Quinones sent at 9/15/2023  9:13 AM CDT -----  Regarding: Nurse call  .Type:  Needs Medical Advice    Who Called:  patient  Best Call Back Number:  [phone]  Additional Information: got a call from our hospital to schedule an US but she is unsure what it is for, she knows her and Dr Bender talked about doing one but was unsure why

## 2023-09-15 NOTE — TELEPHONE ENCOUNTER
Phoned patient, notified Dr. Bender note from yesterday is not signed off. I did discuss dx for need for US Pelvic, in which was noted in Dr. Bender note. Patient verbalized understanding. No additional questions or concerns voiced.

## 2023-09-20 ENCOUNTER — TELEPHONE (OUTPATIENT)
Dept: OBSTETRICS AND GYNECOLOGY | Facility: CLINIC | Age: 28
End: 2023-09-20
Payer: COMMERCIAL

## 2023-09-20 DIAGNOSIS — F41.9 ANXIETY: ICD-10-CM

## 2023-09-20 RX ORDER — BUPROPION HYDROCHLORIDE 150 MG/1
150 TABLET ORAL 2 TIMES DAILY
Qty: 60 TABLET | Refills: 5 | Status: SHIPPED | OUTPATIENT
Start: 2023-09-20 | End: 2023-11-01

## 2023-09-20 NOTE — TELEPHONE ENCOUNTER
----- Message from Brylee Guillory sent at 9/20/2023  1:42 PM CDT -----  Regarding: Refill  Contact: Socorro  Patient called stating that she was supposed to get an additional script of birth control along with her IUD. States that the pharmacy never received the script for this. She is also concerned because she was ordered a pelvic exam and she was unaware that she needed this. Exam is scheduled for tomorrow at 8:00. Patient call back number [phone]         [facility] - [address] [address]  Phone: [phone] Fax: [phone]

## 2023-09-20 NOTE — TELEPHONE ENCOUNTER
Let patient know pelvic US was ordered for IUD placement due to the daily spotting she's been having. Pt declined IUD removal or medication at the time of the visit. Pt to call x1 week if no improvement with bleeding.

## 2023-09-20 NOTE — TELEPHONE ENCOUNTER
Spoke with Pt and notified her of this and she verified understanding. States she is still having very light bleeding with watery D/C states she will call back in one week. Informed Pt to make sure and keep apt  for U/S

## 2023-09-21 ENCOUNTER — HOSPITAL ENCOUNTER (OUTPATIENT)
Dept: RADIOLOGY | Facility: HOSPITAL | Age: 28
Discharge: HOME | End: 2023-09-21
Attending: OBSTETRICS & GYNECOLOGY
Payer: COMMERCIAL

## 2023-09-21 DIAGNOSIS — N92.1 MENOMETRORRHAGIA: ICD-10-CM

## 2023-09-21 PROCEDURE — 76856 US EXAM PELVIC COMPLETE: CPT | Mod: TC

## 2023-10-03 NOTE — PROGRESS NOTES
Chief Complaint:  F/U U/S Pelvis & Medication      History of Present Illness:  Patient is a 27 y.o.  presents to review recent imaging secondary to IUD placement and menometrorrhagia. Daily painless spotting. Previously tried pills, which caused irregular bleeding. Does not desire to try patch. State Nuva ring caused rec VD. Desires permanent sterilization in form of a tubal/ablation.    Also c/o an increase in hair loss with Wellbutrin states did improve  anxiety, repetitive thoughts. Previously tried Zoloft discontinued due to chest pain. Tried Buspar discontinued due to  itching of the feet. Currently in counseling once a month, reports improvement. Denies HI/SI. Able to care for self and children.      LMP: 23  Frequency: irregular w/ IUD   Cycle Length: irregular  Flow: moderate  Intermenstrual Bleeding: Yes  Postcoital Bleeding: No  Dysmenorrhea: No  Sexually Active: yes   Dyspareunia: No  Contraception: Mirena, 23   H/o STI: No   Last pap: 22 NIL  H/o abnl pap prior to current: No   Colposcopy: no  Gardasil: 3/3   MMG: n/a  H/o abnl MMG: n/a  Colonoscopy: n/a      Review of systems:  General/Constitutional: Chills denies. Fatigue/weakness denies. Fever denies. Night sweats denies. Hot flashes denies  Gastrointestinal: Abdominal pain denies. Blood in stool denies. Constipation denies. Diarrhea denies. Heartburn denies. Nausea denies. Vomiting denies   Genitourinary: Incontinence denies. Blood in urine denies. Frequent urination denies. Urgency denies. Painful urination denies. Nocturia denies   Gynecologic: Irregular menses admits. Heavy bleeding admits. Painful menses denies. Vaginal discharge denies. Vaginal odor denies. Vaginal itching/Irritation denies. Vaginal lesion denies.  Pelvic pain denies. Decreased libido denies. Vulvar lesion denies. Prolapse of genital organs denies. Painful intercourse denies. Postcoital bleeding denies   Psychiatric: Mood lability denies. Depressed mood  "denies. Suicidal thoughts denies. Anxiety admits. Overwhelmed denies. Appetite normal. Energy level normal     OB History    Para Term  AB Living   1 1 1 0 0 1   SAB IAB Ectopic Multiple Live Births   0 0 0 0 1      # 1 - Date: 21, Sex: Female, Weight: 3.033 kg (6 lb 11 oz), GA: 37w0d, Delivery: , Low Transverse, Apgar1: None, Apgar5: None, Living: Living, Birth Comments: None      Past Medical History:   Diagnosis Date    Anxiety disorder, unspecified          Current Outpatient Medications:     buPROPion (WELLBUTRIN XL) 150 MG TB24 tablet, Take 1 tablet (150 mg total) by mouth 2 (two) times a day., Disp: 60 tablet, Rfl: 5    levonorgestreL (MIRENA) 21 mcg/24 hours (8 yrs) 52 mg IUD, 1 each by Intrauterine route once., Disp: , Rfl:     busPIRone (BUSPAR) 5 MG Tab, Take 10 mg by mouth 2 (two) times daily., Disp: , Rfl:     norgestimate-ethinyl estradioL (ORTHO-CYCLEN) 0.25-35 mg-mcg per tablet, Take 1 tablet by mouth., Disp: , Rfl:       Physical Exam:  /72   Temp 97.9 °F (36.6 °C)   Ht 5' 3" (1.6 m)   Wt 52.8 kg (116 lb 6.4 oz)   LMP 2023 Comment: Mirena , IUD, LMP 2023- Present  BMI 20.62 kg/m²     Constitutional: General appearance: healthy, well-nourished and well-developed   Psychiatric:  Orientation to time, place and person. Normal mood and affect and active, alert   Abdomen: Auscultation/Inspection/Palpation: No tenderness or masses. Soft, nondistended       Assessment/Plan:  1. Menometrorrhagia  Educated patient on abnormal uterine bleeding     Reviewed workup results       Discussed with patient medical management with possible progesterone only pills, Depo-Provera, Mirena IUD, or surgical management.       Previously tried pills, had irregular bleeding. Currently with IUD, daily painless spotting.    Patient desires surgical management in the form of an endometrial ablation.       US pelvis 23   - Uterus: 8.8 x 4.0 x 5.1 cm;  IUD is present and " appears well position within the endometrial cavity   - ES: 7 mm    - ROV: 5.5 x 4.2 x 4.5 cm;  Multiple (x2) simple, nontender cysts and measure between 2 and 3 cm in greatest diameter  - LOV: 4.4 x 3.2 x 3.0 cm  - Free fluid: no evidence of free fluid within the pelvis     Pap 12/2022: neg    8/2/23 Cultures:  negative gc/cz/tv/myco/urea    Labs 3/22/23  H&H: 12.2/37.1  TSH: 0.883  FT4: 1.01      2. Anxiety  Educated     Counseling information given for Arabella Barros [facility] [phone]     HI & SI precautions  DC wellburtin due to alopecia   Rx: Prozac 10 mg  F/u 6 weeks    3. Consultation for female sterilization.  Discussed permanent sterilization in detail and alternative contraceptive options including natural family planning, barrier, oral contraceptives, patch, NuvaRing, Depo-Provera, Nexplanon, IUDs, abstinence.  We discussed LTC.   Discussed risks of both sterilization including surgical risks, risk of failure, risk of ectopic pregnancy, and risk of regret.   Pt wants to proceed with surgery.  Patient will speak with billing unsure if desires to proceed during 2023 vs 2024 due to reaching insurance deductible. Pt given finical counseling at [facility] info.     If pt desire to put off surgery until calender year 2024, may come in for IUD removal and switch to xulane.  Pt may call to schedule preadmit when desires      Case LTC tubal/ablation

## 2023-10-04 ENCOUNTER — OFFICE VISIT (OUTPATIENT)
Dept: OBSTETRICS AND GYNECOLOGY | Facility: CLINIC | Age: 28
End: 2023-10-04
Payer: COMMERCIAL

## 2023-10-04 VITALS
TEMPERATURE: 98 F | SYSTOLIC BLOOD PRESSURE: 110 MMHG | DIASTOLIC BLOOD PRESSURE: 72 MMHG | WEIGHT: 116.38 LBS | BODY MASS INDEX: 20.62 KG/M2 | HEIGHT: 63 IN

## 2023-10-04 DIAGNOSIS — Z30.09 CONSULTATION FOR FEMALE STERILIZATION: ICD-10-CM

## 2023-10-04 DIAGNOSIS — F41.9 ANXIETY: ICD-10-CM

## 2023-10-04 DIAGNOSIS — N92.1 MENOMETRORRHAGIA: ICD-10-CM

## 2023-10-04 PROCEDURE — 3074F PR MOST RECENT SYSTOLIC BLOOD PRESSURE < 130 MM HG: ICD-10-PCS | Mod: CPTII,,, | Performed by: OBSTETRICS & GYNECOLOGY

## 2023-10-04 PROCEDURE — 99214 OFFICE O/P EST MOD 30 MIN: CPT | Mod: ,,, | Performed by: OBSTETRICS & GYNECOLOGY

## 2023-10-04 PROCEDURE — 1159F PR MEDICATION LIST DOCUMENTED IN MEDICAL RECORD: ICD-10-PCS | Mod: CPTII,,, | Performed by: OBSTETRICS & GYNECOLOGY

## 2023-10-04 PROCEDURE — 1159F MED LIST DOCD IN RCRD: CPT | Mod: CPTII,,, | Performed by: OBSTETRICS & GYNECOLOGY

## 2023-10-04 PROCEDURE — 3078F DIAST BP <80 MM HG: CPT | Mod: CPTII,,, | Performed by: OBSTETRICS & GYNECOLOGY

## 2023-10-04 PROCEDURE — 3008F BODY MASS INDEX DOCD: CPT | Mod: CPTII,,, | Performed by: OBSTETRICS & GYNECOLOGY

## 2023-10-04 PROCEDURE — 3078F PR MOST RECENT DIASTOLIC BLOOD PRESSURE < 80 MM HG: ICD-10-PCS | Mod: CPTII,,, | Performed by: OBSTETRICS & GYNECOLOGY

## 2023-10-04 PROCEDURE — 3074F SYST BP LT 130 MM HG: CPT | Mod: CPTII,,, | Performed by: OBSTETRICS & GYNECOLOGY

## 2023-10-04 PROCEDURE — 99214 PR OFFICE/OUTPT VISIT, EST, LEVL IV, 30-39 MIN: ICD-10-PCS | Mod: ,,, | Performed by: OBSTETRICS & GYNECOLOGY

## 2023-10-04 PROCEDURE — 3008F PR BODY MASS INDEX (BMI) DOCUMENTED: ICD-10-PCS | Mod: CPTII,,, | Performed by: OBSTETRICS & GYNECOLOGY

## 2023-10-04 RX ORDER — FLUOXETINE 10 MG/1
10 CAPSULE ORAL DAILY
Qty: 30 CAPSULE | Refills: 1 | Status: SHIPPED | OUTPATIENT
Start: 2023-10-04 | End: 2024-01-10 | Stop reason: SDUPTHER

## 2023-10-19 ENCOUNTER — TELEPHONE (OUTPATIENT)
Dept: FAMILY MEDICINE | Facility: CLINIC | Age: 28
End: 2023-10-19
Payer: COMMERCIAL

## 2023-10-19 DIAGNOSIS — L03.039 PARONYCHIA OF GREAT TOE: Primary | ICD-10-CM

## 2023-10-19 RX ORDER — CLINDAMYCIN HYDROCHLORIDE 150 MG/1
300 CAPSULE ORAL 3 TIMES DAILY
Qty: 42 CAPSULE | Refills: 0 | Status: SHIPPED | OUTPATIENT
Start: 2023-10-19 | End: 2023-10-26

## 2023-10-19 NOTE — TELEPHONE ENCOUNTER
Pt has a toenail that looks like it may be ingrown.  She stubbed it the other night and now it is red, swollen, and warm to touch.  She sent me a text with a pic if you want to see.  She wants to know if she should take antibiotics or just schedule to remove it next week or so    jim

## 2023-10-27 DIAGNOSIS — B37.31 VAGINAL YEAST INFECTION: Primary | ICD-10-CM

## 2023-10-27 RX ORDER — FLUCONAZOLE 150 MG/1
150 TABLET ORAL DAILY
Qty: 3 TABLET | Refills: 0 | Status: SHIPPED | OUTPATIENT
Start: 2023-10-27 | End: 2023-10-30

## 2023-10-27 NOTE — TELEPHONE ENCOUNTER
Pt asked for something to be sent for vaginal yeast infection.  She recently was on Cleocin and thinks it is related to that. Dr. Diaz said we can send Diflucan

## 2023-10-31 NOTE — H&P (VIEW-ONLY)
Chief Complaint:  Pre-op Exam     History of Present Illness:  Socorro Stevens is a 27 y.o.  who presents to pre-admit for her upcoming tubal/ablation secondary to menometrorrhagia. Daily painless spotting. Previously tried pills, which caused irregular bleeding. Does not desire to try patch. States nuva ring caused recurrent vaginal discharge. Currently with mirena IUD, no improvement with bleeding.       LMP: 10/06/2023  Frequency: irregular w/ IUD   Cycle Length: irregular  Flow: moderate  Intermenstrual Bleeding: Yes  Postcoital Bleeding: No  Dysmenorrhea: No  Sexually Active: yes   Dyspareunia: No  Contraception: Mirena, 23   H/o STI: No   Last pap: 22 NIL  H/o abnl pap prior to current: No   Colposcopy: no  Gardasil: 3/3   MMG: n/a  H/o abnl MMG: n/a  Colonoscopy: n/a      Review of systems:  General/Constitutional: Chills denies. Fatigue/weakness denies. Fever denies . Night sweats denies . Hot flashes denies  Gastrointestinal: Abdominal pain denies. Blood in stool denies. Constipation denies. Diarrhea denies. Heartburn denies. Nausea denies. Vomiting denies   Genitourinary: Incontinence denies. Blood in urine denies. Frequent urination denies.  Urgency denies. Painful urination denies. Nocturia denies   Gynecologic: Irregular menses admits.  Heavy bleeding  denies.  Painful menses denies.  Vaginal discharge denies. Vaginal odor denies. Vaginal itching/Irritation denies. Vaginal lesion denies.  Pelvic pain denies. Decreased libido denies. Vulvar lesion denies. Prolapse of genital organs denies. Painful intercourse denies. Postcoital bleeding denies   Psychiatric: Mood lability denies.  Depressed mood denies. Suicidal thoughts denies. Anxiety denies.  Overwhelmed denies.  Appetite normal. Energy level normal     OB History    Para Term  AB Living   1 1 1 0 0 1   SAB IAB Ectopic Multiple Live Births   0 0 0 0 1      # 1 - Date: 21, Sex: Female, Weight: 3.033 kg (6 lb 11  "oz), GA: 37w0d, Delivery: , Low Transverse, Apgar1: None, Apgar5: None, Living: Living, Birth Comments: None      Past Medical History:   Diagnosis Date    Anxiety disorder, unspecified        Current Outpatient Medications:     FLUoxetine 10 MG capsule, Take 1 capsule (10 mg total) by mouth once daily., Disp: 30 capsule, Rfl: 1    levonorgestreL (MIRENA) 21 mcg/24 hours (8 yrs) 52 mg IUD, 1 each by Intrauterine route once., Disp: , Rfl:     Review of patient's allergies indicates:  No Known Allergies    Past Surgical History:   Procedure Laterality Date    INSERTION OF MIRENA (IUD)  2023    TYMPANIC MEMBRANE REPAIR Right 2013     Family History   Problem Relation Age of Onset    Breast cancer Neg Hx     Colon cancer Neg Hx     Ovarian cancer Neg Hx     Uterine cancer Neg Hx      Social History     Socioeconomic History    Marital status:    Tobacco Use    Smoking status: Never     Passive exposure: Never    Smokeless tobacco: Never   Substance and Sexual Activity    Alcohol use: Yes     Comment: 1-2 times per week    Drug use: Never    Sexual activity: Yes     Partners: Male     Birth control/protection: I.U.D.     Comment: ;  STI: None   Social History Narrative    ** Merged History Encounter **            Physical Exam:  /68   Ht 5' 3" (1.6 m)   Wt 53.2 kg (117 lb 4.6 oz)   BMI 20.78 kg/m²     Constitutional: General appearance: healthy, well-nourished and well-developed   Psychiatric: Orientation to time, place and person. Normal mood and affect and active, alert   Abdomen: Auscultation/Inspection/Palpation: No tenderness or masses. Soft, nondistended       Assessment/Plan:  1. Menometrorrhagia/female sterilization  Explained ablation in detail both in office and hospital. Explained expectations, treatment. goals, failures, complications. Reviewed premeds and postop care. Precautions for fever, excessive bleeding, and severe pain- patient advised to call immediately or " present to ER. Pts. questions asked and answered.       Plan for hysteroscope with endometrial ablation. Discussed risks including but not limited to the following: pain, bleeding, infection, uterine perforation with subsequent damage to bowel, bladder or other abdominal or pelvic organs, need for open abdominal incision to repair injuries, hysterectomy, amenorrhea, sterility, 30-50% chance of needing another procedure in the future including hysterectomy, post-ablation syndrome and chronic pain. Patient acknowledges risks and desires to proceed with surgery. All questions were entertained and answered.     Discussed permanent sterilization in detail and alternative contraceptive options including natural family planning, barrier, oral contraceptives, patch, NuvaRing, Depo-Provera, Nexplanon, IUDs, abstinence.  We discussed LTC.   Discussed risks of both sterilization including surgical risks, risk of failure, risk of ectopic pregnancy, and risk of regret.   Pt wants to proceed with surgery.    Patient with mirena IUD. Removal consent signed for surgery as well.       LTC, Hyst D&C ablation and Mirena removal  11/13/23

## 2023-11-01 ENCOUNTER — OFFICE VISIT (OUTPATIENT)
Dept: OBSTETRICS AND GYNECOLOGY | Facility: CLINIC | Age: 28
End: 2023-11-01
Payer: COMMERCIAL

## 2023-11-01 VITALS
BODY MASS INDEX: 20.79 KG/M2 | HEIGHT: 63 IN | WEIGHT: 117.31 LBS | DIASTOLIC BLOOD PRESSURE: 68 MMHG | SYSTOLIC BLOOD PRESSURE: 110 MMHG

## 2023-11-01 DIAGNOSIS — N92.1 MENOMETRORRHAGIA: Primary | ICD-10-CM

## 2023-11-01 PROCEDURE — 1159F MED LIST DOCD IN RCRD: CPT | Mod: CPTII,,, | Performed by: OBSTETRICS & GYNECOLOGY

## 2023-11-01 PROCEDURE — 3078F DIAST BP <80 MM HG: CPT | Mod: CPTII,,, | Performed by: OBSTETRICS & GYNECOLOGY

## 2023-11-01 PROCEDURE — 1159F PR MEDICATION LIST DOCUMENTED IN MEDICAL RECORD: ICD-10-PCS | Mod: CPTII,,, | Performed by: OBSTETRICS & GYNECOLOGY

## 2023-11-01 PROCEDURE — 3074F PR MOST RECENT SYSTOLIC BLOOD PRESSURE < 130 MM HG: ICD-10-PCS | Mod: CPTII,,, | Performed by: OBSTETRICS & GYNECOLOGY

## 2023-11-01 PROCEDURE — 3008F BODY MASS INDEX DOCD: CPT | Mod: CPTII,,, | Performed by: OBSTETRICS & GYNECOLOGY

## 2023-11-01 PROCEDURE — 99214 PR OFFICE/OUTPT VISIT, EST, LEVL IV, 30-39 MIN: ICD-10-PCS | Mod: ,,, | Performed by: OBSTETRICS & GYNECOLOGY

## 2023-11-01 PROCEDURE — 99214 OFFICE O/P EST MOD 30 MIN: CPT | Mod: ,,, | Performed by: OBSTETRICS & GYNECOLOGY

## 2023-11-01 PROCEDURE — 3074F SYST BP LT 130 MM HG: CPT | Mod: CPTII,,, | Performed by: OBSTETRICS & GYNECOLOGY

## 2023-11-01 PROCEDURE — 3078F PR MOST RECENT DIASTOLIC BLOOD PRESSURE < 80 MM HG: ICD-10-PCS | Mod: CPTII,,, | Performed by: OBSTETRICS & GYNECOLOGY

## 2023-11-01 PROCEDURE — 3008F PR BODY MASS INDEX (BMI) DOCUMENTED: ICD-10-PCS | Mod: CPTII,,, | Performed by: OBSTETRICS & GYNECOLOGY

## 2023-11-01 RX ORDER — MUPIROCIN 20 MG/G
OINTMENT TOPICAL
Status: CANCELLED | OUTPATIENT
Start: 2023-11-01

## 2023-11-01 RX ORDER — SODIUM CHLORIDE 9 MG/ML
INJECTION, SOLUTION INTRAVENOUS CONTINUOUS
Status: CANCELLED | OUTPATIENT
Start: 2023-11-01

## 2023-11-01 RX ORDER — FAMOTIDINE 20 MG/1
20 TABLET, FILM COATED ORAL
Status: CANCELLED | OUTPATIENT
Start: 2023-11-01

## 2023-11-09 ENCOUNTER — HOSPITAL ENCOUNTER (OUTPATIENT)
Dept: PREADMISSION TESTING | Facility: HOSPITAL | Age: 28
Discharge: HOME | End: 2023-11-09
Attending: OBSTETRICS & GYNECOLOGY
Payer: COMMERCIAL

## 2023-11-09 VITALS — WEIGHT: 117 LBS | HEIGHT: 63 IN | BODY MASS INDEX: 20.73 KG/M2

## 2023-11-09 DIAGNOSIS — N92.1 MENOMETRORRHAGIA: ICD-10-CM

## 2023-11-09 LAB
APPEARANCE UR: CLEAR
BACTERIA #/AREA URNS AUTO: NORMAL /HPF
BILIRUB UR QL STRIP.AUTO: NEGATIVE
COLOR UR AUTO: YELLOW
GLUCOSE UR QL STRIP.AUTO: NEGATIVE
KETONES UR QL STRIP.AUTO: NEGATIVE
LEUKOCYTE ESTERASE UR QL STRIP.AUTO: ABNORMAL
NITRITE UR QL STRIP.AUTO: NEGATIVE
PH UR STRIP.AUTO: 7.5 [PH]
PROT UR QL STRIP.AUTO: NEGATIVE
RBC #/AREA URNS AUTO: NORMAL /HPF
RBC UR QL AUTO: ABNORMAL
SP GR UR STRIP.AUTO: 1.01 (ref 1–1.03)
SQUAMOUS #/AREA URNS AUTO: NORMAL /HPF
UROBILINOGEN UR STRIP-ACNC: 0.2
WBC #/AREA URNS AUTO: NORMAL /HPF

## 2023-11-09 PROCEDURE — 81001 URINALYSIS AUTO W/SCOPE: CPT | Performed by: OBSTETRICS & GYNECOLOGY

## 2023-11-09 RX ORDER — LORATADINE 10 MG/1
10 TABLET ORAL DAILY
COMMUNITY
End: 2023-11-30

## 2023-11-09 NOTE — DISCHARGE INSTRUCTIONS

## 2023-11-10 ENCOUNTER — ANESTHESIA EVENT (OUTPATIENT)
Dept: SURGERY | Facility: HOSPITAL | Age: 28
End: 2023-11-10
Payer: COMMERCIAL

## 2023-11-10 NOTE — ANESTHESIA PREPROCEDURE EVALUATION
11/10/2023  Socorro Stevens is a 28 y.o., female.      Pre-op Assessment    I have reviewed the Patient Summary Reports.     I have reviewed the Nursing Notes. I have reviewed the NPO Status.   I have reviewed the Medications.     Review of Systems  Anesthesia Hx:  No problems with previous Anesthesia  Denies Family Hx of Anesthesia complications.   Denies Personal Hx of Anesthesia complications.   Hematology/Oncology:  Hematology Normal   Oncology Normal     EENT/Dental:EENT/Dental Normal   Cardiovascular:  Cardiovascular Normal Exercise tolerance: good     Pulmonary:  Pulmonary Normal    Renal/:  Renal/ Normal     Hepatic/GI:  Hepatic/GI Normal    Musculoskeletal:  Musculoskeletal Normal    Neurological:  Neurology Normal    Endocrine:  Endocrine Normal    Dermatological:  Skin Normal    Psych:   Psychiatric History anxiety          Physical Exam  General: Well nourished, Cooperative, Alert and Oriented    Airway:  Mallampati: II / II  Mouth Opening: Normal  TM Distance: Normal  Tongue: Normal  Neck ROM: Normal ROM    Dental:  Intact        Anesthesia Plan  Type of Anesthesia, risks & benefits discussed:    Anesthesia Type: Gen ETT  Intra-op Monitoring Plan: Standard ASA Monitors  Post Op Pain Control Plan: multimodal analgesia  Induction:  IV  Airway Plan: Direct  Informed Consent: Informed consent signed with the Patient and all parties understand the risks and agree with anesthesia plan.  All questions answered. Patient consented to blood products? Yes  ASA Score: 2  Day of Surgery Review of History & Physical: H&P Update referred to the surgeon/provider.I have interviewed and examined the patient. I have reviewed the patient's H&P dated: There are no significant changes.     Ready For Surgery From Anesthesia Perspective.     .

## 2023-11-13 ENCOUNTER — HOSPITAL ENCOUNTER (OUTPATIENT)
Facility: HOSPITAL | Age: 28
Discharge: HOME | End: 2023-11-13
Attending: OBSTETRICS & GYNECOLOGY | Admitting: OBSTETRICS & GYNECOLOGY
Payer: COMMERCIAL

## 2023-11-13 ENCOUNTER — ANESTHESIA (OUTPATIENT)
Dept: SURGERY | Facility: HOSPITAL | Age: 28
End: 2023-11-13
Payer: COMMERCIAL

## 2023-11-13 VITALS
RESPIRATION RATE: 18 BRPM | SYSTOLIC BLOOD PRESSURE: 128 MMHG | TEMPERATURE: 97 F | HEART RATE: 85 BPM | WEIGHT: 117 LBS | BODY MASS INDEX: 20.73 KG/M2 | DIASTOLIC BLOOD PRESSURE: 80 MMHG | OXYGEN SATURATION: 100 %

## 2023-11-13 DIAGNOSIS — N92.1 MENOMETRORRHAGIA: ICD-10-CM

## 2023-11-13 PROBLEM — Z30.09 CONSULTATION FOR FEMALE STERILIZATION: Status: ACTIVE | Noted: 2023-11-13

## 2023-11-13 LAB
ABO AND RH: NORMAL
ANTIBODY SCREEN: NORMAL
B-HCG SERPL QL: NEGATIVE
ERYTHROCYTE [DISTWIDTH] IN BLOOD BY AUTOMATED COUNT: 12 % (ref 11–14.5)
HCT VFR BLD AUTO: 36.5 % (ref 36–48)
HGB BLD-MCNC: 12.5 G/DL (ref 11.8–16)
MCH RBC QN AUTO: 32.6 PG (ref 27–34)
MCHC RBC AUTO-ENTMCNC: 34.2 G/DL (ref 31–37)
MCV RBC AUTO: 95.3 FL (ref 79–99)
NRBC BLD AUTO-RTO: 0 %
PLATELET # BLD AUTO: 325 X10(3)/MCL (ref 140–371)
PMV BLD AUTO: 8.7 FL (ref 9.4–12.4)
RBC # BLD AUTO: 3.83 X10(6)/MCL (ref 4–5.1)
SPECIMEN OUTDATE: NORMAL
WBC # SPEC AUTO: 5.99 X10(3)/MCL (ref 4–11.5)

## 2023-11-13 PROCEDURE — D9220A PRA ANESTHESIA: ICD-10-PCS | Mod: ,,, | Performed by: NURSE ANESTHETIST, CERTIFIED REGISTERED

## 2023-11-13 PROCEDURE — 58301 REMOVE INTRAUTERINE DEVICE: CPT | Mod: 51,,, | Performed by: OBSTETRICS & GYNECOLOGY

## 2023-11-13 PROCEDURE — 36000709 HC OR TIME LEV III EA ADD 15 MIN: Performed by: OBSTETRICS & GYNECOLOGY

## 2023-11-13 PROCEDURE — 25000003 PHARM REV CODE 250: Performed by: NURSE ANESTHETIST, CERTIFIED REGISTERED

## 2023-11-13 PROCEDURE — 71000015 HC POSTOP RECOV 1ST HR: Performed by: OBSTETRICS & GYNECOLOGY

## 2023-11-13 PROCEDURE — 36000708 HC OR TIME LEV III 1ST 15 MIN: Performed by: OBSTETRICS & GYNECOLOGY

## 2023-11-13 PROCEDURE — 81025 URINE PREGNANCY TEST: CPT | Performed by: OBSTETRICS & GYNECOLOGY

## 2023-11-13 PROCEDURE — 86900 BLOOD TYPING SEROLOGIC ABO: CPT | Performed by: OBSTETRICS & GYNECOLOGY

## 2023-11-13 PROCEDURE — 25000003 PHARM REV CODE 250: Performed by: OBSTETRICS & GYNECOLOGY

## 2023-11-13 PROCEDURE — 37000009 HC ANESTHESIA EA ADD 15 MINS: Performed by: OBSTETRICS & GYNECOLOGY

## 2023-11-13 PROCEDURE — 36415 COLL VENOUS BLD VENIPUNCTURE: CPT | Performed by: OBSTETRICS & GYNECOLOGY

## 2023-11-13 PROCEDURE — D9220A PRA ANESTHESIA: Mod: ,,, | Performed by: NURSE ANESTHETIST, CERTIFIED REGISTERED

## 2023-11-13 PROCEDURE — 58563 PR HYSTEROSCOPY,W/ENDOMETRIAL ABLATION: ICD-10-PCS | Mod: ,,, | Performed by: OBSTETRICS & GYNECOLOGY

## 2023-11-13 PROCEDURE — 37000008 HC ANESTHESIA 1ST 15 MINUTES: Performed by: OBSTETRICS & GYNECOLOGY

## 2023-11-13 PROCEDURE — 85027 COMPLETE CBC AUTOMATED: CPT | Performed by: OBSTETRICS & GYNECOLOGY

## 2023-11-13 PROCEDURE — 63600175 PHARM REV CODE 636 W HCPCS: Performed by: NURSE ANESTHETIST, CERTIFIED REGISTERED

## 2023-11-13 PROCEDURE — 63600175 PHARM REV CODE 636 W HCPCS: Performed by: OBSTETRICS & GYNECOLOGY

## 2023-11-13 PROCEDURE — 27201423 OPTIME MED/SURG SUP & DEVICES STERILE SUPPLY: Performed by: OBSTETRICS & GYNECOLOGY

## 2023-11-13 PROCEDURE — 58670 PR LAP,TUBAL CAUTERY: ICD-10-PCS | Mod: 51,,, | Performed by: OBSTETRICS & GYNECOLOGY

## 2023-11-13 PROCEDURE — 58670 LAPAROSCOPY TUBAL CAUTERY: CPT | Mod: 51,,, | Performed by: OBSTETRICS & GYNECOLOGY

## 2023-11-13 PROCEDURE — 71000016 HC POSTOP RECOV ADDL HR: Performed by: OBSTETRICS & GYNECOLOGY

## 2023-11-13 PROCEDURE — 71000033 HC RECOVERY, INTIAL HOUR: Performed by: OBSTETRICS & GYNECOLOGY

## 2023-11-13 PROCEDURE — 58301 PR REMOVE, INTRAUTERINE DEVICE: ICD-10-PCS | Mod: 51,,, | Performed by: OBSTETRICS & GYNECOLOGY

## 2023-11-13 PROCEDURE — 58563 HYSTEROSCOPY ABLATION: CPT | Mod: ,,, | Performed by: OBSTETRICS & GYNECOLOGY

## 2023-11-13 RX ORDER — SODIUM CHLORIDE 9 MG/ML
INJECTION, SOLUTION INTRAVENOUS CONTINUOUS PRN
Status: DISCONTINUED | OUTPATIENT
Start: 2023-11-13 | End: 2023-11-13

## 2023-11-13 RX ORDER — FAMOTIDINE 20 MG/1
20 TABLET, FILM COATED ORAL
Status: COMPLETED | OUTPATIENT
Start: 2023-11-13 | End: 2023-11-13

## 2023-11-13 RX ORDER — ONDANSETRON 2 MG/ML
INJECTION INTRAMUSCULAR; INTRAVENOUS
Status: DISCONTINUED | OUTPATIENT
Start: 2023-11-13 | End: 2023-11-13

## 2023-11-13 RX ORDER — ONDANSETRON 4 MG/1
8 TABLET, ORALLY DISINTEGRATING ORAL EVERY 8 HOURS PRN
Status: DISCONTINUED | OUTPATIENT
Start: 2023-11-13 | End: 2023-11-13 | Stop reason: HOSPADM

## 2023-11-13 RX ORDER — PROCHLORPERAZINE EDISYLATE 5 MG/ML
5 INJECTION INTRAMUSCULAR; INTRAVENOUS EVERY 6 HOURS PRN
Status: DISCONTINUED | OUTPATIENT
Start: 2023-11-13 | End: 2023-11-13 | Stop reason: HOSPADM

## 2023-11-13 RX ORDER — HYDROCODONE BITARTRATE AND ACETAMINOPHEN 5; 325 MG/1; MG/1
1 TABLET ORAL EVERY 6 HOURS PRN
Qty: 7 TABLET | Refills: 0 | Status: SHIPPED | OUTPATIENT
Start: 2023-11-13 | End: 2023-11-30

## 2023-11-13 RX ORDER — HYDROMORPHONE HYDROCHLORIDE 1 MG/ML
1 INJECTION, SOLUTION INTRAMUSCULAR; INTRAVENOUS; SUBCUTANEOUS EVERY 6 HOURS PRN
Status: DISCONTINUED | OUTPATIENT
Start: 2023-11-13 | End: 2023-11-13 | Stop reason: HOSPADM

## 2023-11-13 RX ORDER — DIPHENHYDRAMINE HCL 25 MG
25 CAPSULE ORAL EVERY 4 HOURS PRN
Status: DISCONTINUED | OUTPATIENT
Start: 2023-11-13 | End: 2023-11-13 | Stop reason: HOSPADM

## 2023-11-13 RX ORDER — LIDOCAINE HYDROCHLORIDE 20 MG/ML
INJECTION, SOLUTION EPIDURAL; INFILTRATION; INTRACAUDAL; PERINEURAL
Status: DISCONTINUED | OUTPATIENT
Start: 2023-11-13 | End: 2023-11-13

## 2023-11-13 RX ORDER — ACETAMINOPHEN 10 MG/ML
INJECTION, SOLUTION INTRAVENOUS
Status: DISCONTINUED | OUTPATIENT
Start: 2023-11-13 | End: 2023-11-13

## 2023-11-13 RX ORDER — MIDAZOLAM HYDROCHLORIDE 1 MG/ML
2 INJECTION INTRAMUSCULAR; INTRAVENOUS
Status: COMPLETED | OUTPATIENT
Start: 2023-11-13 | End: 2023-11-13

## 2023-11-13 RX ORDER — DIPHENHYDRAMINE HYDROCHLORIDE 50 MG/ML
25 INJECTION INTRAMUSCULAR; INTRAVENOUS EVERY 4 HOURS PRN
Status: DISCONTINUED | OUTPATIENT
Start: 2023-11-13 | End: 2023-11-13 | Stop reason: HOSPADM

## 2023-11-13 RX ORDER — FENTANYL CITRATE 50 UG/ML
INJECTION, SOLUTION INTRAMUSCULAR; INTRAVENOUS
Status: DISCONTINUED | OUTPATIENT
Start: 2023-11-13 | End: 2023-11-13

## 2023-11-13 RX ORDER — SODIUM CHLORIDE 9 MG/ML
INJECTION, SOLUTION INTRAVENOUS CONTINUOUS
Status: DISCONTINUED | OUTPATIENT
Start: 2023-11-13 | End: 2023-11-13 | Stop reason: HOSPADM

## 2023-11-13 RX ORDER — KETOROLAC TROMETHAMINE 30 MG/ML
INJECTION, SOLUTION INTRAMUSCULAR; INTRAVENOUS
Status: DISCONTINUED | OUTPATIENT
Start: 2023-11-13 | End: 2023-11-13

## 2023-11-13 RX ORDER — DEXAMETHASONE SODIUM PHOSPHATE 4 MG/ML
INJECTION, SOLUTION INTRA-ARTICULAR; INTRALESIONAL; INTRAMUSCULAR; INTRAVENOUS; SOFT TISSUE
Status: DISCONTINUED | OUTPATIENT
Start: 2023-11-13 | End: 2023-11-13

## 2023-11-13 RX ORDER — HYDROCODONE BITARTRATE AND ACETAMINOPHEN 5; 325 MG/1; MG/1
1 TABLET ORAL EVERY 4 HOURS PRN
Status: DISCONTINUED | OUTPATIENT
Start: 2023-11-13 | End: 2023-11-13 | Stop reason: HOSPADM

## 2023-11-13 RX ORDER — MUPIROCIN 20 MG/G
OINTMENT TOPICAL
Status: DISCONTINUED | OUTPATIENT
Start: 2023-11-13 | End: 2023-11-13 | Stop reason: HOSPADM

## 2023-11-13 RX ORDER — BUPIVACAINE HYDROCHLORIDE 5 MG/ML
INJECTION, SOLUTION EPIDURAL; INTRACAUDAL
Status: DISCONTINUED | OUTPATIENT
Start: 2023-11-13 | End: 2023-11-13 | Stop reason: HOSPADM

## 2023-11-13 RX ORDER — IBUPROFEN 600 MG/1
600 TABLET ORAL EVERY 6 HOURS PRN
Status: DISCONTINUED | OUTPATIENT
Start: 2023-11-13 | End: 2023-11-13 | Stop reason: HOSPADM

## 2023-11-13 RX ORDER — FAMOTIDINE 20 MG/1
20 TABLET, FILM COATED ORAL
Status: ACTIVE | OUTPATIENT
Start: 2023-11-13

## 2023-11-13 RX ORDER — PROPOFOL 10 MG/ML
VIAL (ML) INTRAVENOUS
Status: DISCONTINUED | OUTPATIENT
Start: 2023-11-13 | End: 2023-11-13

## 2023-11-13 RX ORDER — OXYCODONE AND ACETAMINOPHEN 10; 325 MG/1; MG/1
1 TABLET ORAL EVERY 4 HOURS PRN
Status: DISCONTINUED | OUTPATIENT
Start: 2023-11-13 | End: 2023-11-13 | Stop reason: HOSPADM

## 2023-11-13 RX ADMIN — ONDANSETRON 4 MG: 2 INJECTION INTRAMUSCULAR; INTRAVENOUS at 07:11

## 2023-11-13 RX ADMIN — ACETAMINOPHEN 1000 MG: 1000 INJECTION, SOLUTION INTRAVENOUS at 07:11

## 2023-11-13 RX ADMIN — PROPOFOL 150 MG: 10 INJECTION, EMULSION INTRAVENOUS at 07:11

## 2023-11-13 RX ADMIN — SUGAMMADEX 200 MG: 100 INJECTION, SOLUTION INTRAVENOUS at 08:11

## 2023-11-13 RX ADMIN — LIDOCAINE HYDROCHLORIDE 5 ML: 20 INJECTION, SOLUTION EPIDURAL; INFILTRATION; INTRACAUDAL; PERINEURAL at 07:11

## 2023-11-13 RX ADMIN — MIDAZOLAM HYDROCHLORIDE 2 MG: 1 INJECTION, SOLUTION INTRAMUSCULAR; INTRAVENOUS at 07:11

## 2023-11-13 RX ADMIN — KETOROLAC TROMETHAMINE 30 MG: 30 INJECTION, SOLUTION INTRAMUSCULAR; INTRAVENOUS at 08:11

## 2023-11-13 RX ADMIN — FAMOTIDINE 20 MG: 20 TABLET ORAL at 06:11

## 2023-11-13 RX ADMIN — DEXAMETHASONE SODIUM PHOSPHATE 4 MG: 4 INJECTION, SOLUTION INTRA-ARTICULAR; INTRALESIONAL; INTRAMUSCULAR; INTRAVENOUS; SOFT TISSUE at 07:11

## 2023-11-13 RX ADMIN — SODIUM CHLORIDE: 9 INJECTION, SOLUTION INTRAVENOUS at 07:11

## 2023-11-13 RX ADMIN — FENTANYL CITRATE 100 MCG: 50 INJECTION, SOLUTION INTRAMUSCULAR; INTRAVENOUS at 07:11

## 2023-11-13 RX ADMIN — SODIUM CHLORIDE: 9 INJECTION, SOLUTION INTRAVENOUS at 06:11

## 2023-11-13 RX ADMIN — GLYCOPYRROLATE 0.2 MG: 0.2 INJECTION, SOLUTION INTRAMUSCULAR; INTRAVITREAL at 06:11

## 2023-11-13 NOTE — ANESTHESIA PROCEDURE NOTES
Intubation    Date/Time: 11/13/2023 7:17 AM    Performed by: Nba Mcclain CRNA  Authorized by: Nba Mcclain CRNA    Intubation:     Induction:  Intravenous    Intubated:  Postinduction    Mask Ventilation:  Easy with oral airway    Attempts:  1    Attempted By:  CRNA    Method of Intubation:  Direct    Blade:  Patrick 2    Laryngeal View Grade: Grade IIA - cords partially seen      Difficult Airway Encountered?: No      Complications:  None    Airway Device:  Oral endotracheal tube    Airway Device Size:  7.0    Style/Cuff Inflation:  Cuffed (inflated to minimal occlusive pressure)    Inflation Amount (mL):  6    Tube secured:  21    Secured at:  The lips    Placement Verified By:  Capnometry    Complicating Factors:  None    Findings Post-Intubation:  BS equal bilateral

## 2023-11-13 NOTE — INTERVAL H&P NOTE
The patient has been examined and the H&P has been reviewed:    I concur with the findings and no changes have occurred since H&P was written.    Anesthesia/Surgery risks, benefits and alternative options discussed and understood by patient/family.          Active Hospital Problems    Diagnosis  POA    *Menometrorrhagia [N92.1]  Yes    Consultation for female sterilization [Z30.09]  Yes      Resolved Hospital Problems   No resolved problems to display.

## 2023-11-13 NOTE — PLAN OF CARE
Discharge instructions reviewed with patient/family, copy given.  Discharged per wheelchair to car accompanied by family.

## 2023-11-13 NOTE — ANESTHESIA POSTPROCEDURE EVALUATION
Anesthesia Post Evaluation    Patient: Socorro Stevens    Procedure(s) Performed: Procedure(s) (LRB):  LIGATION, FALLOPIAN TUBE, LAPAROSCOPIC (Bilateral)  ABLATION, ENDOMETRIUM, THERMAL, HYSTEROSCOPIC (N/A)  REMOVAL, INTRAUTERINE DEVICE (N/A)    Final Anesthesia Type: general      Patient location during evaluation: PACU  Patient participation: Yes- Able to Participate  Level of consciousness: awake and alert and oriented  Post-procedure vital signs: reviewed and stable  Pain management: adequate  Airway patency: patent  LEONARD mitigation strategies: Verification of full reversal of neuromuscular block  PONV status at discharge: No PONV  Anesthetic complications: no      Cardiovascular status: blood pressure returned to baseline and stable  Respiratory status: spontaneous ventilation and unassisted  Hydration status: euvolemic  Follow-up not needed.  Comments: [facility]          Vitals Value Taken Time   /79 11/13/23 0600   Temp 37.1 °C (98.8 °F) 11/13/23 0600   Pulse 85 11/13/23 0600   Resp 20 11/13/23 0600   SpO2 100 % 11/13/23 0600         No case tracking events are documented in the log.      Pain/Ivonne Score: No data recorded

## 2023-11-13 NOTE — BRIEF OP NOTE
[facility]  Brief Operative Note    Surgery Date: 11/13/2023     Surgeon(s) and Role:     * Elvira Bender MD - Primary    Assisting Surgeon: None    Pre-op Diagnosis:  Menometrorrhagia [N92.1],     Post-op Diagnosis:  Post-Op Diagnosis Codes:     * Menometrorrhagia [N92.1]    Procedure(s) (LRB):  LIGATION, FALLOPIAN TUBE, LAPAROSCOPIC (Bilateral)  ABLATION, ENDOMETRIUM, THERMAL, HYSTEROSCOPIC (N/A)  REMOVAL, INTRAUTERINE DEVICE (N/A)    Anesthesia: General    Operative Findings: normal appearing bilateral ovaries and fallopian tubes.     Estimated Blood Loss: 5 cc         Specimens:   Specimen (24h ago, onward)       Start     Ordered    11/13/23 0800  Specimen to Pathology  RELEASE UPON ORDERING,   Status:  Canceled        References:    Click here for ordering Quick Tip   Question:  Release to patient  Answer:  Immediate    11/13/23 0800                      Discharge Note    OUTCOME: Patient tolerated treatment/procedure well without complication and is now ready for discharge.    DISPOSITION: Home or Self Care    FINAL DIAGNOSIS:  Menometrorrhagia    FOLLOWUP: In clinic    DISCHARGE INSTRUCTIONS:  No discharge procedures on file.

## 2023-11-13 NOTE — DISCHARGE INSTRUCTIONS
Follow-up with Dr. Elvira Bender    Diet as tolerated.    Decrease your activity today and until after your appointment.    No nisha lifting or straining, no pushing or pulling. (10# limit) until after your appointment.    No driving today, while taking pain medication, and until after your appointment.    No intercourse, douching, or tampons until after your appointment.    Showers only, no tub baths until after your appointment.

## 2023-11-13 NOTE — PLAN OF CARE
Returned to room per stretcher. Awake, alert. No complaints. States her pain is not bad. Iced water given and tolerating well. Sites x2 covered with Dermabond, dry and intact.

## 2023-11-13 NOTE — OP NOTE
[facility]  Operative Note    DATE OF PROCEDURE: 2023     PRE-OP DIAGNOSIS: Undesired Fertility, Menometrorrhagia     POST-OP DIAGNOSIS: same    PROCEDURE: Laparoscopic Tubal Cauterization, Novasure Endometrial Ablation, Mirena IUD removal     SURGEON: Elvira Bender MD    ANESTHESIA: General    ESTIMATED BLOOD LOSS: 5cc    INDICATIONS:  Socorro Stevens is a 28 y.o.  female with Menometrorrhagia failed medical management and undesired fertility. Desires Mirena removal, tubal ligation and endometrial ablation.     FINDINGS: normal appearing uterus, bilateral ovaries and fallopian tubes. Normal appearing endometrium.     PROCEDURE IN DETAIL:  After consents were reviewed, patient was taken to the operating room where a time-out was held.  She was placed on the OR table, and after induction of general anesthesia, she was placed in the dorsal lithotomy position in Stephan stirrups and prepped and draped in a standard sterile fashion.      A bivalve speculum was then placed. The IUD was grasped with ring forceps and removed.     The anterior lip of the cervix was grasped with a single tooth tenaculum. Following this a uterine acorn manipulator was placed. Gloves were then changed and attention was turned to the abdomen.   A 5mm infraumbilical skin incision was made with the scalpel.  The abdomen was tented up with towel clips. The Veress needle was introduced into the abdomen, intraabdominal placement was was confirmed with saline drop test. Intraabdominal placement was further confirmed with low opening pressure of CO2 gas.  The abdomen was distended with CO2 to a pressure of 15mmHg. A 5 mm trocar was inserted under direct visualization with manual elevation of the skin.  Patient was positioned in Trendelenburg with the above mentioned findings as noted above. A 5mm blunt port was then placed under direct visualization with the camera without difficulty in the left lower quadrant.   Lloyd Chase  Blade was inserted.  The uterus was mobilized and the fallopian tubes were identified.  Normal pelvic anatomy was noted.  The left fallopian tube was identified and followed out distally until the fimbriated end was visualized.  Approximately 3-4 cm segment of the left fallopian tube was successfully cauterized using the Desai Power Blade .   Hemostasis was noted.  Attention was then turned to the patient's right fallopian tube and in a similar fashion a 3-4 cm segment of the right fallopian tube was successfully cauterized using the Desai Power Blade.  Hemostasis was noted.  Photographic documentation was obtained.  The abdomen and pelvis were inspected; there was no evidence of surgical injury.  The left lower quadrant port was removed under direct visualization and hemostasis was noted. The scope was then removed.  Gas was allowed to expel from the patient's abdomen.  The umbilical port was then removed.  The fascia was closed with a 0 Vicryl suture.  Skin was closed with 4-0 Monocryl.  Local marcaine was applied to port sites.  Dermabond was applied.     The manipulator was removed.    The uterus was sounded to 5.5 cm.  The cervix was then dilated with Hammer dilators to allow passage of a rigid hysteroscope.  The hysteroscope was inserted.  Normal appearing endometrium was noted.  The hysteroscope was removed.  The endometrium was curetted until gritty texture was noted throughput the cavity. Specimen was handed off.       NovaSure device was opened and placed with  recommendations.  Endometrial cavity length was measured at 5.5 cm.  Cavity width measured 3.7 cm.  Cavity assessment was performed; cavity was intact.  Power setting was 112 W.  Burn was initiated and the total burn time was 1 minutes 20 seconds.  After completion of the ablation a hysteroscope was reinserted; complete ablation of all endometrial tissue was appreciated. The tenaculum was removed and the site was noted to be hemostatic.      The patient was awakened and taken to the recovery room in stable condition having tolerated the procedure well.  Sponge lap needle counts correct at the end of the case.

## 2023-11-16 ENCOUNTER — TELEPHONE (OUTPATIENT)
Dept: OBSTETRICS AND GYNECOLOGY | Facility: CLINIC | Age: 28
End: 2023-11-16
Payer: COMMERCIAL

## 2023-11-16 NOTE — TELEPHONE ENCOUNTER
Patient is s/p tubal and ablation 11/13/23. She called this morning stating that yesterday her pants had rubbed against the glue and her site started to bleed. No blood over night. She a shower this morning and washes her body, noticed her site started to bleed again. Denies pain, warmth, drainage or odor. Explained to patient the blood is most likely from trauma to the area. Wound care instructions given. Infection/pain precautions. Advised patient to call office if any changes. Pt voiced understanding.

## 2023-11-29 NOTE — PROGRESS NOTES
Chief Complaint:  Post-op Evaluation     History of Present Illness:  Patient is a 28 y.o.  presents s/p tubal/ablation secondary to menorrhagia and female sterilization. Reports some spotting since procedure. No complaints today.       LMP: occasional spotting since ablation   Frequency: n/a  Cycle Length: n/a  Flow: n/a  Intermenstrual Bleeding: no  Postcoital Bleeding: no  Dysmenorrhea: no  Sexually Active: yes   Dyspareunia: No  Contraception: Tubal/Ablation  H/o STI: No   Last pap: 22 NIL  H/o abnl pap: No   Colposcopy: no  Gardasil: 3/3   MMG: n/a  H/o abnl MMG: n/a  Colonoscopy: n/a      Review of systems:  General/Constitutional: Chills denies. Fatigue/weakness denies. Fever denies. Night sweats denies. Hot flashes denies  Breast: Dimpling denies. Breast mass denies. Breast pain/tenderness denies. Nipple discharge denies. Puckering denies.  Gastrointestinal: Abdominal pain denies. Blood in stool denies. Constipation denies. Diarrhea denies. Heartburn denies. Nausea denies. Vomiting denies   Genitourinary: Incontinence denies. Blood in urine denies. Frequent urination denies. Urgency denies. Painful urination denies. Nocturia denies   Gynecologic: Irregular menses denies. Heavy bleeding denies. Painful menses denies. Vaginal discharge denies. Vaginal odor denies. Vaginal itching/Irritation denies. Vaginal lesion denies.  Pelvic pain denies. Decreased libido denies. Vulvar lesion denies. Prolapse of genital organs denies. Painful intercourse denies. Postcoital bleeding denies   Psychiatric: Mood lability denies. Depressed mood denies. Suicidal thoughts denies. Anxiety denies. Overwhelmed denies. Appetite normal. Energy level normal.     OB History    Para Term  AB Living   1 1 1 0 0 1   SAB IAB Ectopic Multiple Live Births   0 0 0 0 1      # 1 - Date: 21, Sex: Female, Weight: 3.033 kg (6 lb 11 oz), GA: 37w0d, Delivery: , Low Transverse, Apgar1: None, Apgar5: None,  "Living: Living, Birth Comments: None      Past Medical History:   Diagnosis Date    Anxiety disorder, unspecified     Menometrorrhagia 11/13/2023     Past Surgical History:   Procedure Laterality Date    INSERTION OF MIRENA (IUD)  4/27/2023    LAPAROSCOPIC LIGATION OF FALLOPIAN TUBE Bilateral 11/13/2023    Procedure: LIGATION, FALLOPIAN TUBE, LAPAROSCOPIC;  Surgeon: Elvira Bender MD;  Location: [facility] OR;  Service: OB/GYN;  Laterality: Bilateral;    REMOVAL OF INTRAUTERINE DEVICE (IUD) N/A 11/13/2023    Procedure: REMOVAL, INTRAUTERINE DEVICE;  Surgeon: Elvira Bender MD;  Location: [facility] OR;  Service: OB/GYN;  Laterality: N/A;    THERMAL ABLATION OF ENDOMETRIUM USING HYSTEROSCOPY N/A 11/13/2023    Procedure: ABLATION, ENDOMETRIUM, THERMAL, HYSTEROSCOPIC;  Surgeon: Elvira Bender MD;  Location: [facility] OR;  Service: OB/GYN;  Laterality: N/A;    TYMPANIC MEMBRANE REPAIR Right 2013        Current Outpatient Medications:     FLUoxetine 10 MG capsule, Take 1 capsule (10 mg total) by mouth once daily., Disp: 30 capsule, Rfl: 1    Lactobacillus acidophilus (PROBIOTIC ORAL), Take 1 each by mouth once daily., Disp: , Rfl:     loratadine 10 mg Cap, , Disp: , Rfl:   No current facility-administered medications for this visit.    Facility-Administered Medications Ordered in Other Visits:     famotidine tablet 20 mg, 20 mg, Oral, On Call Procedure, Elvira Bender MD      Physical Exam:  /76   Ht 5' 3" (1.6 m)   Wt 53.6 kg (118 lb 2.7 oz)   LMP  (LMP Unknown) Comment: tubal, Ablation  BMI 20.93 kg/m²     Constitutional: General appearance: healthy, well-nourished and well-developed   Psychiatric:  Orientation to time, place and person. Normal mood and affect and active, alert   Abdomen: Auscultation/Inspection/Palpation: No tenderness or masses. Soft, nondistended       Assessment/Plan:  1. Menometrorrhagia  2. S/P tubal ligation  Educated  Reviewed pathology with patient, see below  Discussed with patient bleeding " patterns  RTC for annual or PRN    Hyst d&c pathology 11/13/23  - benign

## 2023-11-30 ENCOUNTER — OFFICE VISIT (OUTPATIENT)
Dept: OBSTETRICS AND GYNECOLOGY | Facility: CLINIC | Age: 28
End: 2023-11-30
Payer: COMMERCIAL

## 2023-11-30 ENCOUNTER — TELEPHONE (OUTPATIENT)
Dept: OBSTETRICS AND GYNECOLOGY | Facility: CLINIC | Age: 28
End: 2023-11-30

## 2023-11-30 VITALS
BODY MASS INDEX: 20.94 KG/M2 | HEIGHT: 63 IN | WEIGHT: 118.19 LBS | SYSTOLIC BLOOD PRESSURE: 118 MMHG | DIASTOLIC BLOOD PRESSURE: 76 MMHG

## 2023-11-30 DIAGNOSIS — Z98.51 S/P TUBAL LIGATION: ICD-10-CM

## 2023-11-30 DIAGNOSIS — N92.1 MENOMETRORRHAGIA: Primary | ICD-10-CM

## 2023-11-30 PROCEDURE — 1159F MED LIST DOCD IN RCRD: CPT | Mod: CPTII,,, | Performed by: OBSTETRICS & GYNECOLOGY

## 2023-11-30 PROCEDURE — 3074F PR MOST RECENT SYSTOLIC BLOOD PRESSURE < 130 MM HG: ICD-10-PCS | Mod: CPTII,,, | Performed by: OBSTETRICS & GYNECOLOGY

## 2023-11-30 PROCEDURE — 99213 OFFICE O/P EST LOW 20 MIN: CPT | Mod: 24,,, | Performed by: OBSTETRICS & GYNECOLOGY

## 2023-11-30 PROCEDURE — 3078F PR MOST RECENT DIASTOLIC BLOOD PRESSURE < 80 MM HG: ICD-10-PCS | Mod: CPTII,,, | Performed by: OBSTETRICS & GYNECOLOGY

## 2023-11-30 PROCEDURE — 3008F PR BODY MASS INDEX (BMI) DOCUMENTED: ICD-10-PCS | Mod: CPTII,,, | Performed by: OBSTETRICS & GYNECOLOGY

## 2023-11-30 PROCEDURE — 3008F BODY MASS INDEX DOCD: CPT | Mod: CPTII,,, | Performed by: OBSTETRICS & GYNECOLOGY

## 2023-11-30 PROCEDURE — 3074F SYST BP LT 130 MM HG: CPT | Mod: CPTII,,, | Performed by: OBSTETRICS & GYNECOLOGY

## 2023-11-30 PROCEDURE — 3078F DIAST BP <80 MM HG: CPT | Mod: CPTII,,, | Performed by: OBSTETRICS & GYNECOLOGY

## 2023-11-30 PROCEDURE — 1159F PR MEDICATION LIST DOCUMENTED IN MEDICAL RECORD: ICD-10-PCS | Mod: CPTII,,, | Performed by: OBSTETRICS & GYNECOLOGY

## 2023-11-30 PROCEDURE — 99213 PR OFFICE/OUTPT VISIT, EST, LEVL III, 20-29 MIN: ICD-10-PCS | Mod: 24,,, | Performed by: OBSTETRICS & GYNECOLOGY

## 2023-11-30 NOTE — TELEPHONE ENCOUNTER
----- Message from Christa Khanna sent at 11/30/2023 10:57 AM CST -----  Regarding: Call Back  Type:  Patient Returning Call    Who Called:  Who Left Message for Patient:  Does the patient know what this is regarding?:  Would the patient rather a call back or a response via [facility]?   Best Call Back Number:[phone]  Additional Information: Pt said she did forget to ask if she is free of any limitations. 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 Also asking if she is able to lift her daughter now as well.

## 2023-12-06 ENCOUNTER — TELEPHONE (OUTPATIENT)
Dept: OBSTETRICS AND GYNECOLOGY | Facility: CLINIC | Age: 28
End: 2023-12-06
Payer: COMMERCIAL

## 2023-12-06 RX ORDER — FLUOXETINE 10 MG/1
CAPSULE ORAL
Qty: 30 CAPSULE | Refills: 1 | OUTPATIENT
Start: 2023-12-06

## 2023-12-06 NOTE — TELEPHONE ENCOUNTER
----- Message from Brylee Guillory sent at 12/6/2023  2:09 PM CST -----  Regarding: Refill  Contact: Socorro  Type:  RX Refill Request    Who Called: Socorro   Refill or New Rx:Refill  RX Name and Strength:FLUoxetine 10 MG capsule  How is the patient currently taking it? (ex. 1XDay):  Is this a 30 day or 90 day RX:  Preferred Pharmacy with phone number:  [facility] - [address] [address]  Phone: [phone] Fax: [phone]  Local or Mail Order:  Ordering Provider:  Would the patient rather a call back or a response via [facility]? Call back  Best Call Back Number:[phone]   Additional Information: Pt called stating that the pharmacy sent us a PA to get her medication refilled. I did not see the PA in her chart.

## 2023-12-06 NOTE — TELEPHONE ENCOUNTER
Spoke with pharmacist at pharmacy, pt does not PA for script. Corrected miscommunication , and informed pt. Pt verified understanding.

## 2024-01-09 NOTE — PROGRESS NOTES
Chief Complaint:  Well Woman    History of Present Illness:  Socorro Stevens is a 28 y.o. year old  presents for her well woman exam. Currently relying on tubal ligation for birth control. Underwent an ablation 2023 due to menometrorrhagia, now resolved. Content.     C/o occasional creamy vaginal discharge. Present before having abnormal bleeding. Odorless.       LMP: no cycle since ablation   Frequency: n/a  Cycle Length: n/a  Flow: n/a  Intermenstrual bleeding: no  Postcoital bleeding: no  Dysmenorrhea: no  Sexually active: yes   Dyspareunia: No  Contraception: Tubal/Ablation  H/o STI: No   Last pap: 22 NIL  H/o abnl pap: No   Colposcopy: no  Gardasil: 3/3   MMG: n/a  H/o abnl MMG: n/a  Colonoscopy: n/a      Review of Systems:  General/Constitutional: Chills denies. Fatigue/weakness denies. Fever denies. Night sweats denies. Hot flashes denies    Respiratory: Cough denies. Hemoptysis denies. SOB denies. Sputum production denies. Wheezing denies .   Cardiovascular: Chest pain denies. Dizziness denies. Palpitations denies. Swelling in hands/feet denies.                Breast: Dimpling denies. Breast mass denies. Breast pain/tenderness denies. Nipple discharge denies. Puckering denies.  Gastrointestinal: Abdominal pain denies. Blood in stool denies. Constipation denies. Diarrhea denies. Heartburn denies. Nausea denies. Vomiting denies    Genitourinary: Incontinence denies. Blood in urine denies. Frequent urination denies. Painful urination denies. Urinary urgency denies. Nocturia denies    Gynecologic: Irregular menses denies. Heavy bleeding denies. Painful menses denies. Vaginal discharge admits. Vaginal odor denies. Vaginal itching denies. Vaginal lesion denies. Pelvic pain denies. Decreased libido denies. Vulvar lesion denies. Prolapse of genital organs denies. Painful intercourse denies. Postcoital bleeding denies    Psychiatric: Depression denies. Anxiety denies.     OB History    Para  Term  AB Living   1 1 1 0 0 1   SAB IAB Ectopic Multiple Live Births   0 0 0 0 1      # 1 - Date: 21, Sex: Female, Weight: 3.033 kg (6 lb 11 oz), GA: 37w0d, Delivery: , Low Transverse, Apgar1: None, Apgar5: None, Living: Living, Birth Comments: None      Past Medical History:   Diagnosis Date    Anxiety disorder, unspecified     Menometrorrhagia 2023       Current Outpatient Medications:     Lactobacillus acidophilus (PROBIOTIC ORAL), Take 1 each by mouth once daily., Disp: , Rfl:     loratadine 10 mg Cap, , Disp: , Rfl:     FLUoxetine 10 MG capsule, Take 1 capsule (10 mg total) by mouth once daily., Disp: 90 capsule, Rfl: 3  No current facility-administered medications for this visit.    Facility-Administered Medications Ordered in Other Visits:     famotidine tablet 20 mg, 20 mg, Oral, On Call Procedure, Elvira Bender MD    Review of patient's allergies indicates:  No Known Allergies    Past Surgical History:   Procedure Laterality Date    INSERTION OF MIRENA (IUD)  2023    LAPAROSCOPIC LIGATION OF FALLOPIAN TUBE Bilateral 2023    Procedure: LIGATION, FALLOPIAN TUBE, LAPAROSCOPIC;  Surgeon: Elvira Bender MD;  Location: [facility] OR;  Service: OB/GYN;  Laterality: Bilateral;    REMOVAL OF INTRAUTERINE DEVICE (IUD) N/A 2023    Procedure: REMOVAL, INTRAUTERINE DEVICE;  Surgeon: Elvira Bender MD;  Location: [facility] OR;  Service: OB/GYN;  Laterality: N/A;    THERMAL ABLATION OF ENDOMETRIUM USING HYSTEROSCOPY N/A 2023    Procedure: ABLATION, ENDOMETRIUM, THERMAL, HYSTEROSCOPIC;  Surgeon: Elvira Bender MD;  Location: [facility] OR;  Service: OB/GYN;  Laterality: N/A;    TYMPANIC MEMBRANE REPAIR Right 2013     Family History   Problem Relation Age of Onset    Breast cancer Neg Hx     Colon cancer Neg Hx     Ovarian cancer Neg Hx     Uterine cancer Neg Hx      Social History     Socioeconomic History    Marital status:    Tobacco Use    Smoking status: Never     Passive  "exposure: Never    Smokeless tobacco: Never   Substance and Sexual Activity    Alcohol use: Yes     Comment: 1-2 times per week    Drug use: Never    Sexual activity: Yes     Partners: Male     Birth control/protection: See Surgical Hx     Comment: ;  STI: None   Social History Narrative    ** Merged History Encounter **            Physical Exam:  /80   Temp 97.7 °F (36.5 °C)   Ht 5' 3" (1.6 m)   Wt 58.1 kg (128 lb)   BMI 22.67 kg/m²     Chaperone: present.     General appearance: healthy, well-nourished and well-developed     Psychiatric: Orientation to time, place and person. Normal mood and affect and active, alert     Skin: Appearance: no rashes or lesions.     Neck:   Neck: supple, FROM, trachea midline. and no masses   Thyroid: no enlargement or nodules and non-tender.       Cardiovascular:   Auscultation: RRR and no murmur.   Peripheral Vascular: no varicosities, LLE edema, RLE edema, calf tenderness, and palpable cord and pedal pulses intact.     Lungs:   Respiratory effort: no intercostal retractions or accessory muscle usage.   Auscultation: no wheezing, rales/crackles, or rhonchi and clear to auscultation.     Breast:   Inspection/Palpation: no tenderness, discrete/distinct masses, skin changes, or abnormal secretions. Nipple appearance normal.     Abdomen:   Auscultation/Inspection/Palpation: no hepatomegaly, splenomegaly, masses, tenderness or CVA tenderness and soft, non-distended bowel sounds preset.    Hernia: no palpable hernias.     Female Genitalia:    Vulva: no masses, tenderness or lesions    Bladder/Urethra: no urethral discharge or mass, normal meatus, bladder non-distended.    Vagina: no tenderness, erythema, cystocele, rectocele, abnormal vaginal discharge or vesicle(s) or ulcers    Cervix: no discharge, no cervical lacerations noted or motion tenderness and grossly normal    Uterus: normal size and shape and midline, non-tender, and no uterine prolapse.    " Adnexa/Parametria: no parametrial tenderness or mass, no adnexal tenderness or ovarian mass.     Lymph Nodes:   Palpation: non tender submandibular nodes, axillary nodes, or inguinal nodes.     Rectal Exam:   Rectum: normal perianal skin.       Assessment/Plan:  1. Well woman exam  Pap  Advised patient if she notices any changes to her breasts, including a lump, mass, dimpling, discharge, rash or tenderness, to should contact us immediately   Healthy diet, exercise   Multivitamin   Seat belt   Sunscreen use   Safe sex/ STI education   Contraception evaluation: tubal ligation   Gardasil evaluation: 3/3    2. Menometrorrhagia  Resolved s/p ablation    3. Anxiety  Educated     Counseling information given for Arabella Barros [facility] [phone]     HI & SI precautions  Mood controlled with Fluoxetine 10 mg daily, desires to continue    -     FLUoxetine 10 MG capsule; Take 1 capsule (10 mg total) by mouth once daily.  Dispense: 90 capsule; Refill: 3

## 2024-01-10 ENCOUNTER — OFFICE VISIT (OUTPATIENT)
Dept: OBSTETRICS AND GYNECOLOGY | Facility: CLINIC | Age: 29
End: 2024-01-10
Payer: COMMERCIAL

## 2024-01-10 VITALS
SYSTOLIC BLOOD PRESSURE: 126 MMHG | HEIGHT: 63 IN | WEIGHT: 128 LBS | DIASTOLIC BLOOD PRESSURE: 80 MMHG | TEMPERATURE: 98 F | BODY MASS INDEX: 22.68 KG/M2

## 2024-01-10 DIAGNOSIS — N92.1 MENOMETRORRHAGIA: ICD-10-CM

## 2024-01-10 DIAGNOSIS — Z01.419 WELL WOMAN EXAM: Primary | ICD-10-CM

## 2024-01-10 DIAGNOSIS — F41.9 ANXIETY: ICD-10-CM

## 2024-01-10 PROCEDURE — 3008F BODY MASS INDEX DOCD: CPT | Mod: CPTII,,, | Performed by: OBSTETRICS & GYNECOLOGY

## 2024-01-10 PROCEDURE — 3074F SYST BP LT 130 MM HG: CPT | Mod: CPTII,,, | Performed by: OBSTETRICS & GYNECOLOGY

## 2024-01-10 PROCEDURE — 1159F MED LIST DOCD IN RCRD: CPT | Mod: CPTII,,, | Performed by: OBSTETRICS & GYNECOLOGY

## 2024-01-10 PROCEDURE — 3079F DIAST BP 80-89 MM HG: CPT | Mod: CPTII,,, | Performed by: OBSTETRICS & GYNECOLOGY

## 2024-01-10 PROCEDURE — 99395 PREV VISIT EST AGE 18-39: CPT | Mod: ,,, | Performed by: OBSTETRICS & GYNECOLOGY

## 2024-01-10 RX ORDER — FLUOXETINE 10 MG/1
10 CAPSULE ORAL DAILY
Qty: 90 CAPSULE | Refills: 3 | Status: SHIPPED | OUTPATIENT
Start: 2024-01-10 | End: 2025-01-09

## 2024-01-12 LAB — PSYCHE PATHOLOGY RESULT: NORMAL

## 2024-04-04 ENCOUNTER — OFFICE VISIT (OUTPATIENT)
Dept: FAMILY MEDICINE | Facility: CLINIC | Age: 29
End: 2024-04-04
Payer: COMMERCIAL

## 2024-04-04 VITALS
OXYGEN SATURATION: 99 % | WEIGHT: 135 LBS | SYSTOLIC BLOOD PRESSURE: 110 MMHG | DIASTOLIC BLOOD PRESSURE: 62 MMHG | TEMPERATURE: 98 F | BODY MASS INDEX: 23.92 KG/M2 | HEIGHT: 63 IN | HEART RATE: 87 BPM
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
## 2024-04-04 PROCEDURE — 1159F MED LIST DOCD IN RCRD: CPT | Mod: CPTII,,, | Performed by: PEDIATRICS

## 2024-04-04 PROCEDURE — 3078F DIAST BP <80 MM HG: CPT | Mod: CPTII,,, | Performed by: PEDIATRICS

## 2024-04-04 PROCEDURE — 3074F SYST BP LT 130 MM HG: CPT | Mod: CPTII,,, | Performed by: PEDIATRICS

## 2024-04-04 PROCEDURE — 3008F BODY MASS INDEX DOCD: CPT | Mod: CPTII,,, | Performed by: PEDIATRICS

## 2024-04-04 PROCEDURE — 1160F RVW MEDS BY RX/DR IN RCRD: CPT | Mod: CPTII,,, | Performed by: PEDIATRICS

## 2024-04-04 PROCEDURE — 99212 OFFICE O/P EST SF 10 MIN: CPT | Mod: ,,, | Performed by: PEDIATRICS

## 2024-04-04 NOTE — PROGRESS NOTES
Subjective     Patient ID: Socorro Stevens is a 28 y.o. female.    Chief Complaint: Rash (Luis Alberto arms and legs)    Rash         She has itchy small red bumps on her arms and legs off and on for the last few weeks.  They are improving.  Her daughter had similar symptoms.  She does not recall any definite new or unusual exposure.  She does not have any other symptoms.    Objective     Physical Exam     She has a few tiny pink papules on her hand and wrist and legs, she says they are much improved compared to a few days ago, she does not have any other abnormalities, they are most consistent with bug bites    Assessment and Plan     1. Insect bite, unspecified site, initial encounter      Her skin is very dry so we talked about moisturizing her skin twice daily and avoiding anything to dry it out more.  We talked about a few things to watch for and reasons to call or return.

## 2024-04-16 ENCOUNTER — TELEPHONE (OUTPATIENT)
Dept: OBSTETRICS AND GYNECOLOGY | Facility: CLINIC | Age: 29
End: 2024-04-16
Payer: COMMERCIAL

## 2024-04-16 NOTE — TELEPHONE ENCOUNTER
"----- Message from Cheli Alejandro sent at 4/16/2024  8:25 AM CDT -----  Regarding: PT Advice  Contact: Patient  CALLER: Patient  Telephone Information:  Mobile          [phone]  :  Patient is calling in regards to medication FLUoxetine 10 MG capsule. She is feeling like its not lasting. Morning times she is feeling nausea. Other days she feels like a "zombie, no emotion". She feels like she is having more off days than good days.     [facility] - [address] [address]  Phone: [phone] Fax: [phone]  "

## 2024-04-16 NOTE — TELEPHONE ENCOUNTER
Per KB pt needs to come in to be evaluated. Pt states she would like Thursday morning, will have front staff call her with appt in morning.

## 2024-04-18 ENCOUNTER — OFFICE VISIT (OUTPATIENT)
Dept: OBSTETRICS AND GYNECOLOGY | Facility: CLINIC | Age: 29
End: 2024-04-18
Payer: COMMERCIAL

## 2024-04-18 VITALS
DIASTOLIC BLOOD PRESSURE: 78 MMHG | SYSTOLIC BLOOD PRESSURE: 122 MMHG | WEIGHT: 134.19 LBS | HEIGHT: 62 IN | BODY MASS INDEX: 24.69 KG/M2 | TEMPERATURE: 98 F

## 2024-04-18 DIAGNOSIS — N92.1 MENOMETRORRHAGIA: ICD-10-CM

## 2024-04-18 DIAGNOSIS — F41.9 ANXIETY: Primary | ICD-10-CM

## 2024-04-18 PROCEDURE — 3078F DIAST BP <80 MM HG: CPT | Mod: CPTII,,, | Performed by: OBSTETRICS & GYNECOLOGY

## 2024-04-18 PROCEDURE — 99214 OFFICE O/P EST MOD 30 MIN: CPT | Mod: ,,, | Performed by: OBSTETRICS & GYNECOLOGY

## 2024-04-18 PROCEDURE — 1159F MED LIST DOCD IN RCRD: CPT | Mod: CPTII,,, | Performed by: OBSTETRICS & GYNECOLOGY

## 2024-04-18 PROCEDURE — 3074F SYST BP LT 130 MM HG: CPT | Mod: CPTII,,, | Performed by: OBSTETRICS & GYNECOLOGY

## 2024-04-18 PROCEDURE — 3008F BODY MASS INDEX DOCD: CPT | Mod: CPTII,,, | Performed by: OBSTETRICS & GYNECOLOGY

## 2024-04-18 RX ORDER — ESCITALOPRAM OXALATE 5 MG/1
5 TABLET ORAL DAILY
Qty: 60 TABLET | Refills: 0 | Status: SHIPPED | OUTPATIENT
Start: 2024-04-18 | End: 2024-05-01 | Stop reason: ALTCHOICE

## 2024-04-18 NOTE — PROGRESS NOTES
"Chief Complaint:  Follow-up (Follow up on Fluoxetine. )    History of Present Illness:  Patient is a 28 y.o.  presents to discuss Fluoxetine. States its helping her OCD tendencies and did improve anxiety. However it makes her feels like "a zombie" and decreased happiness. Reports having more off days than good, her anxiety will randomly spike. States medication will occasionally make her nauseated and has increase in agitation.     Previously tried Wellbutrin, improved anxiety and repetitive thoughts but was unsure if the hair loss was related.  Tried Zoloft, discontinued due to chest pain. Tried Buspar, discontinued due to itching of the feet. Denies HI/SI. Able to care for self and children. Contemplating on restarting Wellbutrin.      LMP: no cycle since ablation   Frequency: n/a  Cycle length: n/a  Flow: n/a  Intermenstrual bleeding: no  Postcoital bleeding: no  Dysmenorrhea: no  Sexually active: yes   Dyspareunia: No  Contraception: BTL  H/o STI: No  Last pap: 1/10/24 NIL  H/o abnl pap: No  Colposcopy: no  Gardasil: 3/3  MMG: n/a  H/o abnl MMG: n/a  Colonoscopy: n/a      Review of systems:  General/Constitutional: Chills denies. Fatigue/weakness denies. Fever denies. Night sweats denies. Hot flashes denies  Gastrointestinal: Abdominal pain denies. Blood in stool denies. Constipation denies. Diarrhea denies. Heartburn denies. Nausea denies. Vomiting denies   Genitourinary: Incontinence denies. Blood in urine denies. Frequent urination denies. Urgency denies. Painful urination denies. Nocturia denies   Gynecologic: Irregular menses denies. Heavy bleeding denies. Painful menses denies. Vaginal discharge denies. Vaginal odor denies. Vaginal itching/Irritation denies. Vaginal lesion denies.  Pelvic pain denies. Decreased libido denies. Vulvar lesion denies. Prolapse of genital organs denies. Painful intercourse denies. Postcoital bleeding denies   Psychiatric: Mood lability denies. Depressed mood denies. " "Suicidal thoughts denies. Anxiety ADMITS. Overwhelmed denies. Appetite normal. Energy level normal.     OB History    Para Term  AB Living   1 1 1 0 0 1   SAB IAB Ectopic Multiple Live Births   0 0 0 0 1      # 1 - Date: 21, Sex: Female, Weight: 3.033 kg (6 lb 11 oz), GA: 37w0d, Type: , Low Transverse, Apgar1: None, Apgar5: None, Living: Living, Birth Comments: None      Past Medical History:   Diagnosis Date    Anxiety disorder, unspecified     Menometrorrhagia 2023       Current Outpatient Medications:     FLUoxetine 10 MG capsule, Take 1 capsule (10 mg total) by mouth once daily., Disp: 90 capsule, Rfl: 3    Lactobacillus acidophilus (PROBIOTIC ORAL), Take 1 each by mouth once daily., Disp: , Rfl:     loratadine 10 mg Cap, , Disp: , Rfl:   No current facility-administered medications for this visit.    Facility-Administered Medications Ordered in Other Visits:     famotidine tablet 20 mg, 20 mg, Oral, On Call Procedure, Elvira Bender MD      Physical Exam:  /78   Temp 98.2 °F (36.8 °C)   Ht 5' 2" (1.575 m)   Wt 60.9 kg (134 lb 3.2 oz)   BMI 24.55 kg/m²     Constitutional: General appearance: healthy, well-nourished and well-developed   Psychiatric:  Orientation to time, place and person. Normal mood and affect and active, alert   Abdomen: Auscultation/Inspection/Palpation: No tenderness or masses. Soft, nondistended       Assessment/Plan:  1. Anxiety  Educated     HI & SI precautions  Discussed mental health provider or f/u w/ PCP, pt declines at this time  Desires to discuss medication change  Continuing with counseling    Previous medications and side-effects:   Wellbutrin 150XL -  alopecia  Zoloft -  chest pain  Buspar -  itching of feet  Prozac - agitation     Offered drug-free holiday vs discuss other options  Stop Prozac 10  Rx: Lexapro 5 mg  Follow up 6 weeks       2. Menometrorrhagia  S/p tubal/ablation 23  Bleeding resolved         This note was " transcribed by Dayanna Kennedy MA. There may be transcription errors as a result, however minimal. Effort has been made to ensure accuracy of transcription, but any obvious errors or omissions should be clarified with the author of the document.

## 2024-05-01 ENCOUNTER — PATIENT MESSAGE (OUTPATIENT)
Dept: OBSTETRICS AND GYNECOLOGY | Facility: CLINIC | Age: 29
End: 2024-05-01
Payer: COMMERCIAL

## 2024-05-01 DIAGNOSIS — F41.9 ANXIETY: Primary | ICD-10-CM

## 2024-05-01 RX ORDER — BUPROPION HYDROCHLORIDE 150 MG/1
150 TABLET ORAL DAILY
Qty: 30 TABLET | Refills: 1 | Status: SHIPPED | OUTPATIENT
Start: 2024-05-01 | End: 2024-05-30

## 2024-05-15 ENCOUNTER — PATIENT MESSAGE (OUTPATIENT)
Dept: FAMILY MEDICINE | Facility: CLINIC | Age: 29
End: 2024-05-15
Payer: COMMERCIAL

## 2024-05-27 NOTE — PROGRESS NOTES
Chief Complaint:  Follow-up (Wellbutrin 150 mg XL)    History of Present Illness:  Patient is a 28 y.o.  presents to follow up Wellbutrin 150 mg. Pt states she's been taking two 150 mg tablets. Reports it's working very well. No signs of hair loss or side effects. Desires to continue taking 300 mg.      Gyn History:  Menstrual History   Cycle: No (s/p ablation)  Menarche Age: 12 years  Flow Duration:  (n/a)  Flow:  (n/a)  Interval:  (n /a)  Intermenstrual Bleeding: No  Dysmenorrhea: No  No Cycle Reason: (!) Surgical  Surgical Reason: ablation  [place]  Sexually Active: Yes  Sexual Orientation: heterosexual  Postcoital Bleeding: No  Dyspareunia: No  STI History: No  Contraception: Yes  Contraception Type: Tubal ligation/salpingectony  Menopause  Menopause Age: 1 years  Breast History  Last Breast Imaging Date: No  History of Breast Biopsy: No  Pap History   Last pap date: 01/10/24  Result: Normal  History of Abnormal Pap: No  HPV Vaccine Completed: Yes (3/3)      Review of systems:  General/Constitutional: Chills denies. Fatigue/weakness denies. Fever denies. Night sweats denies. Hot flashes denies  Breast: Dimpling denies. Breast mass denies. Breast pain/tenderness denies. Nipple discharge denies. Puckering denies.  Gastrointestinal: Abdominal pain denies. Blood in stool denies. Constipation denies. Diarrhea denies. Heartburn denies. Nausea denies. Vomiting denies   Genitourinary: Incontinence denies. Blood in urine denies. Frequent urination denies. Urgency denies. Painful urination denies. Nocturia denies   Gynecologic: Irregular menses denies. Heavy bleeding denies. Painful menses denies. Vaginal discharge denies. Vaginal odor denies. Vaginal itching/Irritation denies. Vaginal lesion denies.  Pelvic pain denies. Decreased libido denies. Vulvar lesion denies. Prolapse of genital organs denies. Painful intercourse denies. Postcoital bleeding denies   Psychiatric: Mood lability denies. Depressed mood  Diabetes is improving with treatment.   Continue current treatment regimen.  Diabetes will be reassessed in 3 months.   "denies. Suicidal thoughts denies. Anxiety denies. Overwhelmed denies. Appetite normal. Energy level normal.     OB History    Para Term  AB Living   1 1 1 0 0 1   SAB IAB Ectopic Multiple Live Births   0 0 0 0 1      # 1 - Date: 21, Sex: Female, Weight: 3.033 kg (6 lb 11 oz), GA: 37w0d, Type: , Low Transverse, Apgar1: None, Apgar5: None, Living: Living, Birth Comments: None      Past Medical History:   Diagnosis Date    Anxiety disorder, unspecified     Menometrorrhagia 2023       Current Outpatient Medications:     buPROPion (WELLBUTRIN XL) 150 MG TB24 tablet, Take 1 tablet (150 mg total) by mouth once daily., Disp: 30 tablet, Rfl: 1    Lactobacillus acidophilus (PROBIOTIC ORAL), Take 1 each by mouth once daily., Disp: , Rfl:     loratadine 10 mg Cap, , Disp: , Rfl:     multivitamin with minerals tablet, Take 1 tablet by mouth once daily., Disp: , Rfl:   No current facility-administered medications for this visit.    Facility-Administered Medications Ordered in Other Visits:     famotidine tablet 20 mg, 20 mg, Oral, On Call Procedure, Elvira Bender MD      Physical Exam:  /80 (BP Location: Right arm, Patient Position: Sitting, BP Method: Medium (Manual))   Temp 97.9 °F (36.6 °C) (Temporal)   Ht 5' 2" (1.575 m)   Wt 62.1 kg (137 lb)   BMI 25.06 kg/m²     Constitutional: General appearance: healthy, well-nourished and well-developed   Psychiatric:  Orientation to time, place and person. Normal mood and affect and active, alert       Assessment/Plan:  1. Anxiety  Educated  Significant improvement, content  HI & SI precautions  Desires to continue with Wellbutrin 300 mg XL  Rx: Wellbutrin 300 mg XL #90, 1 refill  Follow up 6 mos      Previous medications and side-effects:   Wellbutrin 150XL -  alopecia  Zoloft -  chest pain  Buspar -  itching of feet  Prozac - agitation   Lexapro - headaches/difficulty sleeping        This note was transcribed by Dayanna Kennedy MA. There may " be transcription errors as a result, however minimal. Effort has been made to ensure accuracy of transcription, but any obvious errors or omissions should be clarified with the author of the document.

## 2024-05-30 ENCOUNTER — OFFICE VISIT (OUTPATIENT)
Dept: OBSTETRICS AND GYNECOLOGY | Facility: CLINIC | Age: 29
End: 2024-05-30
Payer: COMMERCIAL

## 2024-05-30 VITALS
TEMPERATURE: 98 F | BODY MASS INDEX: 25.21 KG/M2 | SYSTOLIC BLOOD PRESSURE: 118 MMHG | HEIGHT: 62 IN | DIASTOLIC BLOOD PRESSURE: 80 MMHG | WEIGHT: 137 LBS

## 2024-05-30 DIAGNOSIS — F41.9 ANXIETY: Primary | ICD-10-CM

## 2024-05-30 PROCEDURE — 3008F BODY MASS INDEX DOCD: CPT | Mod: CPTII,,, | Performed by: OBSTETRICS & GYNECOLOGY

## 2024-05-30 PROCEDURE — 3079F DIAST BP 80-89 MM HG: CPT | Mod: CPTII,,, | Performed by: OBSTETRICS & GYNECOLOGY

## 2024-05-30 PROCEDURE — 3074F SYST BP LT 130 MM HG: CPT | Mod: CPTII,,, | Performed by: OBSTETRICS & GYNECOLOGY

## 2024-05-30 PROCEDURE — 99213 OFFICE O/P EST LOW 20 MIN: CPT | Mod: ,,, | Performed by: OBSTETRICS & GYNECOLOGY

## 2024-05-30 RX ORDER — BUPROPION HYDROCHLORIDE 300 MG/1
300 TABLET ORAL DAILY
Qty: 90 TABLET | Refills: 1 | Status: SHIPPED | OUTPATIENT
Start: 2024-05-30 | End: 2025-05-30

## 2024-07-08 ENCOUNTER — PATIENT MESSAGE (OUTPATIENT)
Dept: OBSTETRICS AND GYNECOLOGY | Facility: CLINIC | Age: 29
End: 2024-07-08
Payer: COMMERCIAL

## 2024-09-03 ENCOUNTER — PATIENT MESSAGE (OUTPATIENT)
Dept: OBSTETRICS AND GYNECOLOGY | Facility: CLINIC | Age: 29
End: 2024-09-03
Payer: COMMERCIAL

## 2024-09-03 ENCOUNTER — TELEPHONE (OUTPATIENT)
Dept: OBSTETRICS AND GYNECOLOGY | Facility: CLINIC | Age: 29
End: 2024-09-03
Payer: COMMERCIAL

## 2024-09-03 NOTE — PROGRESS NOTES
Chief Complaint:  Med Change Request    History of Present Illness:  Patient is a 28 y.o.  s/p BTL/ablation presents to discuss changing her anxiety medication. Currently on Wellbutrin 300 mg XL. Reported initial improvement with Wellbutrin. Now c/o emotions being intensified, states she is cries easily at movies and is more agitated. States the repetitive/OCD tendencies have not returned.  +hypersensitive, burst of energy in waves. Has undergone counseling with her [employer] in the past due to relationship issues. Desires to try a new counselor.  Denies HI/SI. States she will make an appointment w/ Arabella Barros. Denies PMH BP. Denies Rebekah    States she can feel herself ovulating. Diffuse pain for approximately  36hrs.  2-3/10. No pain today. S/p ablation, no cycle since. Previous vaginal discharge resolved.        Gyn History:  Menstrual History  Cycle: No  Menarche Age: 12 years  No Cycle Reason: (!) Surgical  Surgical Reason: ablation    Menopause  Menopause Age: 1 years  Post Menopausal Bleeding: No  Hormone Replacement Therapy: No    Pap History  Last pap date: 01/10/24  Result: Normal  History of Abnormal Pap: No  HPV Vaccine Completed: Yes (3/3)  HPV Vaccine Injection Type: 3 Injection Series    [place]  Sexually Active: Yes  Sexual Orientation: heterosexual  Postcoital Bleeding: No  Dyspareunia: No  STI History: No  Contraception: Yes  Contraception Type:  (Ablation)    Breast History  Last Breast Imaging Date: No  History of Breast Biopsy: No      Review of systems:  General/Constitutional: Chills denies. Fatigue/weakness denies. Fever denies. Night sweats denies. Hot flashes denies  Breast: Dimpling denies. Breast mass denies. Breast pain/tenderness denies. Nipple discharge denies. Puckering denies.  Gastrointestinal: Abdominal pain denies. Blood in stool denies. Constipation denies. Diarrhea denies. Heartburn denies. Nausea denies. Vomiting denies   Genitourinary: Incontinence denies. Blood in  "urine denies. Frequent urination denies. Urgency denies. Painful urination denies. Nocturia denies   Gynecologic: Irregular menses denies. Heavy bleeding denies. Painful menses denies. Vaginal discharge denies. Vaginal odor denies. Vaginal itching/Irritation denies. Vaginal lesion denies.  Pelvic pain denies. Decreased libido denies. Vulvar lesion denies. Prolapse of genital organs denies. Painful intercourse denies. Postcoital bleeding denies   Psychiatric: Mood lability denies. Depressed mood denies. Suicidal thoughts denies. Anxiety ADMITS. Overwhelmed denies. Appetite normal. Energy level normal.     OB History    Para Term  AB Living   1 1 1 0 0 1   SAB IAB Ectopic Multiple Live Births   0 0 0 0 1      # 1 - Date: 21, Sex: Female, Weight: 3.033 kg (6 lb 11 oz), GA: 37w0d, Type: , Low Transverse, Apgar1: None, Apgar5: None, Living: Living, Birth Comments: None      Past Medical History:   Diagnosis Date    Anxiety disorder, unspecified     Menometrorrhagia 2023       Current Outpatient Medications:     buPROPion (WELLBUTRIN XL) 300 MG 24 hr tablet, Take 1 tablet (300 mg total) by mouth once daily., Disp: 90 tablet, Rfl: 1    Lactobacillus acidophilus (PROBIOTIC ORAL), Take 1 each by mouth once daily., Disp: , Rfl:     loratadine 10 mg Cap, , Disp: , Rfl:     multivitamin with minerals tablet, Take 1 tablet by mouth once daily., Disp: , Rfl:   No current facility-administered medications for this visit.    Facility-Administered Medications Ordered in Other Visits:     famotidine tablet 20 mg, 20 mg, Oral, On Call Procedure, Elvira Bender MD      Physical Exam:  /80 (BP Location: Left arm, Patient Position: Sitting)   Temp 98.8 °F (37.1 °C)   Ht 5' 2" (1.575 m)   Wt 62 kg (136 lb 9.6 oz)   BMI 24.98 kg/m²     Constitutional: General appearance: healthy, well-nourished and well-developed   Psychiatric:  Orientation to time, place and person. Normal mood and affect and " active, alert       Assessment/Plan:  1. Anxiety  Educated  HI & SI precautions  Follow up with PCP, Dr. Diaz   Advised to make appt with Arabella Barros, [phone]  Dayanna will contact Dr. Diaz's nurse, Ayala regarding next opening  Stop  Wellbutrin 300 mg XL daily  Rx: Wellbutrin 150 mg SR         Previous medications and side-effects:   Wellbutrin 300XL - agitation, mood swings  Wellbutrin 150XL -  alopecia  Zoloft -  chest pain  Buspar -  itching of feet  Prozac - controls OCD tendencies, however feels like her emotions were subdued  Lexapro - headaches/difficulty sleeping     2. [place]  Educated  NSAIDs at time of ovulation, Aleve  Heating pad          This note was transcribed by Dayanna Kennedy MA. There may be transcription errors as a result, however minimal. I agree with transcription and every effort has been made to ensure accuracy of transcription, but any obvious errors or omissions should be clarified with the author of the document.

## 2024-09-03 NOTE — TELEPHONE ENCOUNTER
Pt messaged via patient portal wanted to change anxiety medication. Contacted patient. Denies HI/SI. Still taking her Wellbutrin but she's noticed a drastic difference in her mood and actions. States her symptoms heightened. More agitated. States will make an appointment to see Arabella Barros. Call transferred to [facility] to schedule.

## 2024-09-04 ENCOUNTER — OFFICE VISIT (OUTPATIENT)
Dept: OBSTETRICS AND GYNECOLOGY | Facility: CLINIC | Age: 29
End: 2024-09-04
Payer: COMMERCIAL

## 2024-09-04 VITALS
BODY MASS INDEX: 25.14 KG/M2 | TEMPERATURE: 99 F | WEIGHT: 136.63 LBS | DIASTOLIC BLOOD PRESSURE: 80 MMHG | HEIGHT: 62 IN | SYSTOLIC BLOOD PRESSURE: 124 MMHG

## 2024-09-04 DIAGNOSIS — N94.0 MITTELSCHMERZ: ICD-10-CM

## 2024-09-04 DIAGNOSIS — F41.9 ANXIETY: Primary | ICD-10-CM

## 2024-09-04 PROCEDURE — 1159F MED LIST DOCD IN RCRD: CPT | Mod: CPTII,,, | Performed by: OBSTETRICS & GYNECOLOGY

## 2024-09-04 PROCEDURE — 3074F SYST BP LT 130 MM HG: CPT | Mod: CPTII,,, | Performed by: OBSTETRICS & GYNECOLOGY

## 2024-09-04 PROCEDURE — 3008F BODY MASS INDEX DOCD: CPT | Mod: CPTII,,, | Performed by: OBSTETRICS & GYNECOLOGY

## 2024-09-04 PROCEDURE — 99214 OFFICE O/P EST MOD 30 MIN: CPT | Mod: ,,, | Performed by: OBSTETRICS & GYNECOLOGY

## 2024-09-04 PROCEDURE — 3079F DIAST BP 80-89 MM HG: CPT | Mod: CPTII,,, | Performed by: OBSTETRICS & GYNECOLOGY

## 2024-09-04 RX ORDER — BUPROPION HYDROCHLORIDE 300 MG/1
300 TABLET ORAL DAILY
Qty: 30 TABLET | Refills: 1 | Status: SHIPPED | OUTPATIENT
Start: 2024-09-04 | End: 2025-09-04

## 2024-09-04 RX ORDER — BUPROPION HYDROCHLORIDE 150 MG/1
150 TABLET, EXTENDED RELEASE ORAL 2 TIMES DAILY
Qty: 60 TABLET | Refills: 1 | Status: SHIPPED | OUTPATIENT
Start: 2024-09-04 | End: 2025-09-04

## 2024-09-19 ENCOUNTER — CLINICAL SUPPORT (OUTPATIENT)
Dept: FAMILY MEDICINE | Facility: CLINIC | Age: 29
End: 2024-09-19
Payer: COMMERCIAL

## 2024-09-19 DIAGNOSIS — Z23 NEED FOR VACCINATION: Primary | ICD-10-CM

## 2024-09-19 PROCEDURE — 90471 IMMUNIZATION ADMIN: CPT | Mod: ,,, | Performed by: PEDIATRICS

## 2024-09-19 PROCEDURE — 90656 IIV3 VACC NO PRSV 0.5 ML IM: CPT | Mod: ,,, | Performed by: PEDIATRICS

## 2024-09-27 ENCOUNTER — OFFICE VISIT (OUTPATIENT)
Dept: FAMILY MEDICINE | Facility: CLINIC | Age: 29
End: 2024-09-27
Payer: COMMERCIAL

## 2024-09-27 VITALS
BODY MASS INDEX: 25.4 KG/M2 | WEIGHT: 138 LBS | HEART RATE: 97 BPM | OXYGEN SATURATION: 96 % | HEIGHT: 62 IN | SYSTOLIC BLOOD PRESSURE: 126 MMHG | DIASTOLIC BLOOD PRESSURE: 76 MMHG | TEMPERATURE: 99 F

## 2024-09-27 DIAGNOSIS — F41.9 ANXIETY: Primary | ICD-10-CM

## 2024-09-27 DIAGNOSIS — M54.9 BACK PAIN, UNSPECIFIED BACK LOCATION, UNSPECIFIED BACK PAIN LATERALITY, UNSPECIFIED CHRONICITY: ICD-10-CM

## 2024-09-27 PROCEDURE — 1159F MED LIST DOCD IN RCRD: CPT | Mod: CPTII,,, | Performed by: PEDIATRICS

## 2024-09-27 PROCEDURE — 3074F SYST BP LT 130 MM HG: CPT | Mod: CPTII,,, | Performed by: PEDIATRICS

## 2024-09-27 PROCEDURE — 99213 OFFICE O/P EST LOW 20 MIN: CPT | Mod: ,,, | Performed by: PEDIATRICS

## 2024-09-27 PROCEDURE — 3008F BODY MASS INDEX DOCD: CPT | Mod: CPTII,,, | Performed by: PEDIATRICS

## 2024-09-27 PROCEDURE — 1160F RVW MEDS BY RX/DR IN RCRD: CPT | Mod: CPTII,,, | Performed by: PEDIATRICS

## 2024-09-27 PROCEDURE — 3078F DIAST BP <80 MM HG: CPT | Mod: CPTII,,, | Performed by: PEDIATRICS

## 2024-09-27 RX ORDER — MELOXICAM 15 MG/1
TABLET ORAL
Qty: 30 TABLET | Refills: 1 | Status: SHIPPED | OUTPATIENT
Start: 2024-09-27

## 2024-10-14 ENCOUNTER — TELEPHONE (OUTPATIENT)
Dept: FAMILY MEDICINE | Facility: CLINIC | Age: 29
End: 2024-10-14
Payer: COMMERCIAL

## 2024-10-14 DIAGNOSIS — F41.9 ANXIETY: Primary | ICD-10-CM

## 2024-10-14 NOTE — TELEPHONE ENCOUNTER
Pt said she talked to you about her wellbutrin that she was taking for ocd.  She weaned off for the 2 weeks like you suggested.  She has still been having some anxiety, she does want to try another med.  She doesn't feel like she is rushing nonstop and is not getting nauseated.  She thinks you mentioned Cymbalta.

## 2024-10-17 RX ORDER — DULOXETIN HYDROCHLORIDE 30 MG/1
30 CAPSULE, DELAYED RELEASE ORAL DAILY
Qty: 30 CAPSULE | Refills: 5 | Status: SHIPPED | OUTPATIENT
Start: 2024-10-17

## 2025-01-06 ENCOUNTER — TELEPHONE (OUTPATIENT)
Dept: FAMILY MEDICINE | Facility: CLINIC | Age: 30
End: 2025-01-06
Payer: COMMERCIAL

## 2025-01-06 DIAGNOSIS — F41.9 ANXIETY: Primary | ICD-10-CM

## 2025-01-06 NOTE — TELEPHONE ENCOUNTER
Pt said she is taking the cymbalta 30mg.  She said she feels good mood wise but she does find that she is more agitated at the end of the day and keeps replaying things in her head every now and then. Some of her ocd symptoms.  She wants to know if she an increase it to see if it helps.

## 2025-01-08 RX ORDER — DULOXETIN HYDROCHLORIDE 60 MG/1
60 CAPSULE, DELAYED RELEASE ORAL DAILY
Qty: 30 CAPSULE | Refills: 5 | Status: SHIPPED | OUTPATIENT
Start: 2025-01-08

## 2025-01-08 NOTE — TELEPHONE ENCOUNTER
Dr. Diaz said that she can increase to 60mg daily.  Pt notified. She will let us know if it does not help.

## 2025-01-08 NOTE — PROGRESS NOTES
Chief Complaint:   Well Woman     History of Present Illness:  Socorro Stevens is a 29 y.o. year old  presents for her well woman exam. Currently relying on tubal for birth control. S/p ablation 23, no cycle since procedure.    Self discontinued Wellbutrin 150 mg. Mood controlled with Cymbalta 60 mg, managed by Dr. Diaz. Feeling great.        Gyn History:  Menstrual History  Cycle: No  Menarche Age: 12 years  No Cycle Reason: (!) Surgical  Surgical Reason: ablation    Menopause  Menopause Age: 1 years  Post Menopausal Bleeding: No  Hormone Replacement Therapy: No    Pap History  Last pap date: 01/10/24  Result: Normal  History of Abnormal Pap: No  HPV Vaccine Completed: Yes (3/3)  HPV Vaccine Injection Type: 3 Injection Series    [place]  Sexually Active: Yes  Sexual Orientation: heterosexual  Postcoital Bleeding: No  Dyspareunia: No  STI History: No  Contraception: Yes  Contraception Type:  (ablation)    Breast History  Last Breast Imaging Date: No  History of Breast Biopsy: No        Review of Systems:  General/Constitutional: Chills denies. Fatigue/weakness denies. Fever denies. Night sweats denies. Hot flashes denies    Respiratory: Cough denies. Hemoptysis denies. SOB denies. Sputum production denies. Wheezing denies .   Cardiovascular: Chest pain denies. Dizziness denies. Palpitations denies. Swelling in hands/feet denies.                Breast: Dimpling denies. Breast mass denies. Breast pain/tenderness denies. Nipple discharge denies. Puckering denies.  Gastrointestinal: Abdominal pain denies. Blood in stool denies. Constipation denies. Diarrhea denies. Heartburn denies. Nausea denies. Vomiting denies    Genitourinary: Incontinence denies. Blood in urine denies. Frequent urination denies. Painful urination denies. Urinary urgency denies. Nocturia denies    Gynecologic: Irregular menses denies. Heavy bleeding denies. Painful menses denies. Vaginal discharge denies. Vaginal odor denies.  Vaginal itching denies. Vaginal lesion denies. Pelvic pain denies. Decreased libido denies. Vulvar lesion denies. Prolapse of genital organs denies. Painful intercourse denies. Postcoital bleeding denies    Psychiatric: Depression denies. Anxiety denies.     OB History    Para Term  AB Living   1 1 1 0 0 1   SAB IAB Ectopic Multiple Live Births   0 0 0 0 1      # 1 - Date: 21, Sex: Female, Weight: 3.033 kg (6 lb 11 oz), GA: 37w0d, Type: , Low Transverse, Apgar1: None, Apgar5: None, Living: Living, Birth Comments: None      Past Medical History:   Diagnosis Date    Anxiety disorder, unspecified     Dysmenorrhea     Dyspareunia     Endometriosis of uterus     Menometrorrhagia 2023       Current Outpatient Medications:     DULoxetine (CYMBALTA) 60 MG capsule, Take 1 capsule (60 mg total) by mouth once daily., Disp: 30 capsule, Rfl: 5    Lactobacillus acidophilus (PROBIOTIC ORAL), Take 1 each by mouth once daily., Disp: , Rfl:     loratadine 10 mg Cap, , Disp: , Rfl:     multivitamin with minerals tablet, Take 1 tablet by mouth once daily., Disp: , Rfl:   No current facility-administered medications for this visit.    Facility-Administered Medications Ordered in Other Visits:     famotidine tablet 20 mg, 20 mg, Oral, On Call Procedure, Elvira Bender MD    Review of patient's allergies indicates:  No Known Allergies    Past Surgical History:   Procedure Laterality Date    ADENOIDECTOMY       SECTION      INSERTION OF MIRENA (IUD)  2023    LAPAROSCOPIC LIGATION OF FALLOPIAN TUBE Bilateral 2023    Procedure: LIGATION, FALLOPIAN TUBE, LAPAROSCOPIC;  Surgeon: Elvira Bender MD;  Location: [facility] OR;  Service: OB/GYN;  Laterality: Bilateral;    REMOVAL OF INTRAUTERINE DEVICE (IUD) N/A 2023    Procedure: REMOVAL, INTRAUTERINE DEVICE;  Surgeon: Elvira Bender MD;  Location: [facility] OR;  Service: OB/GYN;  Laterality: N/A;    THERMAL ABLATION OF ENDOMETRIUM USING  HYSTEROSCOPY N/A 11/13/2023    Procedure: ABLATION, ENDOMETRIUM, THERMAL, HYSTEROSCOPIC;  Surgeon: Elvira Bender MD;  Location: [facility] OR;  Service: OB/GYN;  Laterality: N/A;    TUBAL LIGATION  11/13/2023    TYMPANIC MEMBRANE REPAIR Right 2013     Family History   Problem Relation Name Age of Onset    Hypertension Mother Xiomy Bush     Breast cancer Neg Hx      Colon cancer Neg Hx      Ovarian cancer Neg Hx      Uterine cancer Neg Hx      Cervical cancer Neg Hx       Social History     Socioeconomic History    Marital status:    Tobacco Use    Smoking status: Never     Passive exposure: Never    Smokeless tobacco: Never   Substance and Sexual Activity    Alcohol use: Yes     Alcohol/week: 3.0 standard drinks of alcohol     Types: 3 Glasses of wine per week     Comment: 1-2 times per week    Drug use: Never    Sexual activity: Yes     Partners: Male     Birth control/protection: See Surgical Hx     Comment: ;  STI: None   Social History Narrative    ** Merged History Encounter **          Social Drivers of Health     Financial Resource Strain: Low Risk  (9/24/2024)    Overall Financial Resource Strain (CARDIA)     Difficulty of Paying Living Expenses: Not hard at all   Food Insecurity: No Food Insecurity (9/24/2024)    Hunger Vital Sign     Worried About Running Out of Food in the Last Year: Never true     Ran Out of Food in the Last Year: Never true   Physical Activity: Insufficiently Active (9/24/2024)    Exercise Vital Sign     Days of Exercise per Week: 3 days     Minutes of Exercise per Session: 30 min   Stress: No Stress Concern Present (9/24/2024)    [employer] - Occupational Stress Questionnaire     Feeling of Stress : Only a little   Housing Stability: Unknown (9/24/2024)    Housing Stability Vital Sign     Unable to Pay for Housing in the Last Year: No       Physical Exam:  /74 (BP Location: Left arm, Patient Position: Sitting)   Temp 97.9 °F (36.6 °C)    "Ht 5' 2.01" (1.575 m)   Wt 63.8 kg (140 lb 9.6 oz)   BMI 25.71 kg/m²     Chaperone: present.       General appearance: healthy, well-nourished and well-developed     Psychiatric: Orientation to time, place and person. Normal mood and affect and active, alert     Skin:   Appearance: no rashes or lesions.     Neck:   Neck: supple, FROM, trachea midline. and no masses   Thyroid: no enlargement or nodules and non-tender.       Cardiovascular:   Auscultation: RRR and no murmur.   Peripheral Vascular: no varicosities, LLE edema, RLE edema, calf tenderness, and palpable cord and pedal pulses intact.     Lungs:   Respiratory effort: no intercostal retractions or accessory muscle usage.   Auscultation: no wheezing, rales/crackles, or rhonchi and clear to auscultation.     Breast:   Inspection/Palpation: no tenderness, discrete/distinct masses, skin changes, or abnormal secretions. Nipple appearance normal.     Abdomen:   Auscultation/Inspection/Palpation: no hepatomegaly, splenomegaly, masses, tenderness or CVA tenderness and soft, non-distended bowel sounds preset.    Hernia: no palpable hernias.     Female Genitalia:   Vulva: no masses, tenderness or lesions   Bladder/Urethra: no urethral discharge or mass, normal meatus, bladder non-distended.   Vagina: no tenderness, erythema, cystocele, rectocele, abnormal vaginal discharge or vesicle(s) or ulcers   Cervix: no discharge, no cervical lacerations noted or motion tenderness and grossly normal   Uterus: normal size and shape and midline, non-tender, and no uterine prolapse.   Adnexa/Parametria: no parametrial tenderness or mass, no adnexal tenderness or ovarian mass.     Lymph Nodes:   Palpation: non tender submandibular nodes, axillary nodes, or inguinal nodes.     Rectal Exam:   Rectum: normal perianal skin.        Assessment/Plan:  1. Well woman exam  Pap  Recommend BSE monthly  Discussed recommendations of annual screening after age of 40 with mammogram    Explained " that screening is not 100% reliable. Advised patient if she notices any changes to her breast including a lump, mass, dimpling, discharge, rash, or tenderness she should contact us immediately.     Healthy diet, exercise   Multivitamin   Seat belt   Sunscreen use   Safe sex/ STI education   Contraception evaluation: tubal   Gardasil evaluation: 3/3    2. Anxiety  Educated  Counseling information given for Arabella Barros [phone]  HI & SI precautions  Discontinued Wellbutrin 150 mg  Mood controlled with Cymbalta 60 mg, managed by Dr. Diaz           This note was transcribed by Dayanna Kennedy MA. There may be transcription errors as a result, however minimal. I agree with transcription and every effort has been made to ensure accuracy of transcription, but any obvious errors or omissions should be clarified with the author of the document.

## 2025-01-15 ENCOUNTER — OFFICE VISIT (OUTPATIENT)
Dept: OBSTETRICS AND GYNECOLOGY | Facility: CLINIC | Age: 30
End: 2025-01-15
Payer: COMMERCIAL

## 2025-01-15 VITALS
TEMPERATURE: 98 F | BODY MASS INDEX: 25.88 KG/M2 | SYSTOLIC BLOOD PRESSURE: 122 MMHG | DIASTOLIC BLOOD PRESSURE: 74 MMHG | HEIGHT: 62 IN | WEIGHT: 140.63 LBS

## 2025-01-15 DIAGNOSIS — Z01.419 WELL WOMAN EXAM: Primary | ICD-10-CM

## 2025-01-15 DIAGNOSIS — F41.9 ANXIETY: ICD-10-CM

## 2025-01-15 PROCEDURE — 1159F MED LIST DOCD IN RCRD: CPT | Mod: CPTII,,, | Performed by: OBSTETRICS & GYNECOLOGY

## 2025-01-15 PROCEDURE — 3008F BODY MASS INDEX DOCD: CPT | Mod: CPTII,,, | Performed by: OBSTETRICS & GYNECOLOGY

## 2025-01-15 PROCEDURE — 3078F DIAST BP <80 MM HG: CPT | Mod: CPTII,,, | Performed by: OBSTETRICS & GYNECOLOGY

## 2025-01-15 PROCEDURE — 3074F SYST BP LT 130 MM HG: CPT | Mod: CPTII,,, | Performed by: OBSTETRICS & GYNECOLOGY

## 2025-01-15 PROCEDURE — 99395 PREV VISIT EST AGE 18-39: CPT | Mod: ,,, | Performed by: OBSTETRICS & GYNECOLOGY

## 2025-01-23 LAB — PSYCHE PATHOLOGY RESULT: NORMAL

## 2025-03-21 ENCOUNTER — LAB VISIT (OUTPATIENT)
Dept: LAB | Facility: HOSPITAL | Age: 30
End: 2025-03-21
Attending: OTOLARYNGOLOGY
Payer: COMMERCIAL

## 2025-03-21 DIAGNOSIS — L50.9 URTICARIA, UNSPECIFIED: Primary | ICD-10-CM

## 2025-03-21 LAB
25(OH)D3+25(OH)D2 SERPL-MCNC: 68 NG/ML (ref 30–80)
ALBUMIN SERPL-MCNC: 5.5 G/DL (ref 3.4–5)
ALBUMIN/GLOB SERPL: 1.7 RATIO
ALP SERPL-CCNC: 65 UNIT/L (ref 50–144)
ALT SERPL-CCNC: 20 UNIT/L (ref 1–45)
ANION GAP SERPL CALC-SCNC: 9 MEQ/L (ref 2–13)
AST SERPL-CCNC: 28 UNIT/L (ref 14–36)
BASOPHILS # BLD AUTO: 0.07 X10(3)/MCL (ref 0.01–0.08)
BASOPHILS NFR BLD AUTO: 0.8 % (ref 0.1–1.2)
BILIRUB SERPL-MCNC: 1 MG/DL (ref 0–1)
BILIRUBIN DIRECT+TOT PNL SERPL-MCNC: 0.4 MG/DL (ref 0–0.3)
BUN SERPL-MCNC: 21 MG/DL (ref 7–20)
C3 SERPL-MCNC: 114 MG/DL (ref 80–173)
C4 SERPL-MCNC: 29 MG/DL (ref 13–46)
CALCIUM SERPL-MCNC: 9.8 MG/DL (ref 8.4–10.2)
CHLORIDE SERPL-SCNC: 104 MMOL/L (ref 98–110)
CO2 SERPL-SCNC: 24 MMOL/L (ref 21–32)
CREAT SERPL-MCNC: 0.68 MG/DL (ref 0.66–1.25)
CREAT/UREA NIT SERPL: 31 (ref 12–20)
EOSINOPHIL # BLD AUTO: 0.06 X10(3)/MCL (ref 0.04–0.36)
EOSINOPHIL NFR BLD AUTO: 0.7 % (ref 0.7–7)
ERYTHROCYTE [DISTWIDTH] IN BLOOD BY AUTOMATED COUNT: 12.2 % (ref 11–14.5)
ERYTHROCYTE [SEDIMENTATION RATE] IN BLOOD: 1 MM/HR (ref 0–15)
GFR SERPLBLD CREATININE-BSD FMLA CKD-EPI: >90 ML/MIN/1.73/M2
GLOBULIN SER-MCNC: 3.2 GM/DL (ref 2–3.9)
GLUCOSE SERPL-MCNC: 80 MG/DL (ref 70–115)
HCT VFR BLD AUTO: 39.6 % (ref 36–48)
HGB BLD-MCNC: 13.3 G/DL (ref 11.8–16)
IMM GRANULOCYTES # BLD AUTO: 0.02 X10(3)/MCL (ref 0–0.03)
IMM GRANULOCYTES NFR BLD AUTO: 0.2 % (ref 0–0.5)
IRON SERPL-MCNC: 152 UG/DL (ref 37–170)
LYMPHOCYTES # BLD AUTO: 1.99 X10(3)/MCL (ref 1.16–3.74)
LYMPHOCYTES NFR BLD AUTO: 23 % (ref 20–55)
MCH RBC QN AUTO: 32.4 PG (ref 27–34)
MCHC RBC AUTO-ENTMCNC: 33.6 G/DL (ref 31–37)
MCV RBC AUTO: 96.4 FL (ref 79–99)
MONOCYTES # BLD AUTO: 0.46 X10(3)/MCL (ref 0.24–0.36)
MONOCYTES NFR BLD AUTO: 5.3 % (ref 4.7–12.5)
NEUTROPHILS # BLD AUTO: 6.06 X10(3)/MCL (ref 1.56–6.13)
NEUTROPHILS NFR BLD AUTO: 70 % (ref 37–73)
NRBC BLD AUTO-RTO: 0 %
PLATELET # BLD AUTO: 407 X10(3)/MCL (ref 140–371)
PMV BLD AUTO: 8.6 FL (ref 9.4–12.4)
POTASSIUM SERPL-SCNC: 4.5 MMOL/L (ref 3.5–5.1)
PROT SERPL-MCNC: 8.7 GM/DL (ref 6.3–8.2)
RBC # BLD AUTO: 4.11 X10(6)/MCL (ref 4–5.1)
RHEUMATOID FACT SERPL-ACNC: <13 IU/ML
SODIUM SERPL-SCNC: 137 MMOL/L (ref 136–145)
T3FREE SERPL-MCNC: 3.2 PG/ML (ref 1.58–3.91)
T4 FREE SERPL-MCNC: 0.99 NG/DL (ref 0.78–2.19)
TSH SERPL-ACNC: 1.04 UIU/ML (ref 0.36–3.74)
VIT B12 SERPL-MCNC: 511 PG/ML (ref 211–946)
WBC # BLD AUTO: 8.66 X10(3)/MCL (ref 4–11.5)

## 2025-03-21 PROCEDURE — 80053 COMPREHEN METABOLIC PANEL: CPT

## 2025-03-21 PROCEDURE — 86431 RHEUMATOID FACTOR QUANT: CPT

## 2025-03-21 PROCEDURE — 84443 ASSAY THYROID STIM HORMONE: CPT

## 2025-03-21 PROCEDURE — 84439 ASSAY OF FREE THYROXINE: CPT

## 2025-03-21 PROCEDURE — 85652 RBC SED RATE AUTOMATED: CPT

## 2025-03-21 PROCEDURE — 83540 ASSAY OF IRON: CPT

## 2025-03-21 PROCEDURE — 86003 ALLG SPEC IGE CRUDE XTRC EA: CPT | Mod: 59

## 2025-03-21 PROCEDURE — 82180 ASSAY OF ASCORBIC ACID: CPT

## 2025-03-21 PROCEDURE — 86376 MICROSOMAL ANTIBODY EACH: CPT

## 2025-03-21 PROCEDURE — 82306 VITAMIN D 25 HYDROXY: CPT

## 2025-03-21 PROCEDURE — 82607 VITAMIN B-12: CPT

## 2025-03-21 PROCEDURE — 86160 COMPLEMENT ANTIGEN: CPT

## 2025-03-21 PROCEDURE — 86332 IMMUNE COMPLEX ASSAY: CPT

## 2025-03-21 PROCEDURE — 84481 FREE ASSAY (FT-3): CPT

## 2025-03-21 PROCEDURE — 83883 ASSAY NEPHELOMETRY NOT SPEC: CPT

## 2025-03-21 PROCEDURE — 86003 ALLG SPEC IGE CRUDE XTRC EA: CPT

## 2025-03-21 PROCEDURE — 85025 COMPLETE CBC W/AUTO DIFF WBC: CPT

## 2025-03-21 PROCEDURE — 86162 COMPLEMENT TOTAL (CH50): CPT

## 2025-03-21 PROCEDURE — 86039 ANTINUCLEAR ANTIBODIES (ANA): CPT

## 2025-03-21 PROCEDURE — 86800 THYROGLOBULIN ANTIBODY: CPT

## 2025-03-21 PROCEDURE — 82248 BILIRUBIN DIRECT: CPT

## 2025-03-21 PROCEDURE — 36415 COLL VENOUS BLD VENIPUNCTURE: CPT

## 2025-03-22 LAB — ANA SER QL HEP2 SUBST: NORMAL

## 2025-03-24 LAB
ALLERGEN CORN IGE (OLG): 0.22 KUA/L
ALLERGEN EGG WHOLE IGE (OLG): <0.1 KUA/L
ALLERGEN MILK IGE (OLG): <0.1 KUA/L
ALLERGEN PEANUT IGE (OLG): <0.1 KUA/L
ALLERGEN WHEAT IGE (OLG): 0.13 KUA/L
C1INH SERPL-MCNC: 27 MG/DL (ref 19–37)
CH50 SERPL-ACNC: 54 U/ML (ref 30–75)

## 2025-03-25 LAB
THYROGLOB AB SERPL IA-ACNC: 21 IU/ML
THYROID PEROXIDASE QUANT (OLG): <4 IU/ML

## 2025-03-26 LAB — IC SERPL C1Q BIND-ACNC: 2.7 UG EQ/ML (ref 0–3.9)

## 2025-06-06 DIAGNOSIS — F41.9 ANXIETY: ICD-10-CM

## 2025-06-06 RX ORDER — DULOXETIN HYDROCHLORIDE 60 MG/1
CAPSULE, DELAYED RELEASE ORAL
Qty: 90 CAPSULE | Refills: 1 | Status: SHIPPED | OUTPATIENT
Start: 2025-06-06

## (undated) DEVICE — FORCEP BPLR ENDOSCOPIC 310MM

## (undated) DEVICE — DEVICE ABLATION NOVASURE DISP

## (undated) DEVICE — PACK BASIC

## (undated) DEVICE — TRAY DRY SKIN SCRUB PREP

## (undated) DEVICE — SUT VICRYL+ 27 UR-6 VIOL

## (undated) DEVICE — JELLY KY LUBRICATING 5G PACKET

## (undated) DEVICE — SLEEVE KII ADV FIX 5X100MM

## (undated) DEVICE — SET EXT MACROBORE 15IN

## (undated) DEVICE — TROCAR ENDO Z THREAD KII 5X100

## (undated) DEVICE — Device

## (undated) DEVICE — TUBING SUCTION CONNECTING 10FT

## (undated) DEVICE — NDL INSUFFLATION VERRES 120MM

## (undated) DEVICE — DRESSING TELFA N ADH 3X8IN

## (undated) DEVICE — SEAL LENS SCOPE MYOSURE

## (undated) DEVICE — DRAPE LEGGINGS CUFF 33X51IN

## (undated) DEVICE — DRAPE LEGGINGS CUFF 31X48IN

## (undated) DEVICE — GLOVE PROTEXIS PI SYN SURG 8.5

## (undated) DEVICE — ADHESIVE DERMABOND ADVANCED

## (undated) DEVICE — SOL NACL IRR 1000ML BTL

## (undated) DEVICE — DRAPE UND BUTT W/POUCH 40X44IN

## (undated) DEVICE — TRAY CATH URETHRAL FOLEY 16FR

## (undated) DEVICE — SUT MONOCYRL 4-0 PS2 UND

## (undated) DEVICE — KIT MAJOR SINGLE BASIN

## (undated) DEVICE — TUBE AQUILEX VACUUM SET HI LO

## (undated) DEVICE — BLADE SURG STAINLESS STEEL #11

## (undated) DEVICE — TRAY CATH FOL SIL DRN BAG 16FR